# Patient Record
Sex: FEMALE | Race: WHITE | NOT HISPANIC OR LATINO | Employment: PART TIME | URBAN - METROPOLITAN AREA
[De-identification: names, ages, dates, MRNs, and addresses within clinical notes are randomized per-mention and may not be internally consistent; named-entity substitution may affect disease eponyms.]

---

## 2018-06-05 ENCOUNTER — HOSPITAL ENCOUNTER (EMERGENCY)
Facility: HOSPITAL | Age: 28
Discharge: HOME | End: 2018-06-05
Payer: MEDICAID

## 2018-06-05 VITALS
RESPIRATION RATE: 20 BRPM | WEIGHT: 203 LBS | HEIGHT: 69 IN | BODY MASS INDEX: 30.07 KG/M2 | DIASTOLIC BLOOD PRESSURE: 97 MMHG | TEMPERATURE: 98 F | HEART RATE: 71 BPM | SYSTOLIC BLOOD PRESSURE: 136 MMHG | OXYGEN SATURATION: 100 %

## 2018-06-05 DIAGNOSIS — F41.8 DEPRESSION WITH ANXIETY: Primary | ICD-10-CM

## 2018-06-05 PROCEDURE — 99283 EMERGENCY DEPT VISIT LOW MDM: CPT

## 2018-06-05 RX ORDER — ALPRAZOLAM 0.25 MG/1
0.25 TABLET ORAL 3 TIMES DAILY PRN
Qty: 15 TABLET | Refills: 0 | Status: SHIPPED | OUTPATIENT
Start: 2018-06-05 | End: 2022-04-21

## 2018-06-05 RX ORDER — SERTRALINE HYDROCHLORIDE 50 MG/1
50 TABLET, FILM COATED ORAL DAILY
Qty: 30 TABLET | Refills: 1 | Status: SHIPPED | OUTPATIENT
Start: 2018-06-05 | End: 2018-04-21

## 2018-06-05 NOTE — ED PROVIDER NOTES
Encounter Date: 6/5/2018       History   No chief complaint on file.    Patient to ER for evaluation, reports OBGYN told her that she needed to be seen in ER due thoughts of about new baby being sick all the time. Reports child is 5 months old and Mom was diagnosed with post partum depression right after delivery, reports trying different medications-Zoloft, Celexa didn't help much but doesn't think the Zoloft was given enough time to work. Also used Xanax prn. Reports now not taking anything. Is from [place] but visiting Mom her today. Patient denies any suicidal, homicidal thoughts or hallucinations.          Review of patient's allergies indicates:  Allergies not on file  No past medical history on file.  No past surgical history on file.  No family history on file.  Social History   Substance Use Topics    Smoking status: Not on file    Smokeless tobacco: Not on file    Alcohol use Not on file     Review of Systems   Constitutional: Negative.    HENT: Negative.    Eyes: Negative.    Respiratory: Negative.    Cardiovascular: Negative.    Musculoskeletal: Negative.    Psychiatric/Behavioral: Negative for agitation, behavioral problems, confusion, dysphoric mood, hallucinations, self-injury, sleep disturbance and suicidal ideas. The patient is nervous/anxious (Mild at times, not during exam.).        Physical Exam     Initial Vitals   BP Pulse Resp Temp SpO2   -- -- -- -- --      MAP       --         Physical Exam    Vitals reviewed.  Constitutional: She appears well-developed and well-nourished.   HENT:   Head: Normocephalic and atraumatic.   Neck: Normal range of motion. Neck supple. No thyromegaly present.   Cardiovascular: Normal rate, regular rhythm and normal heart sounds.   Pulmonary/Chest: Breath sounds normal.   Psychiatric: She has a normal mood and affect. Her behavior is normal. Judgment and thought content normal.   Very appropriate during exam. Answered questions appropriately. No anxious  "patterns or depressive symptoms manifested during visit. Patient does state she uses "google" a lot to look up things and thinks baby is always sick with something.          ED Course   Procedures  Labs Reviewed - No data to display         Education regarding setting up appointment with Psychiatrist or PCP upon arrival home. To start back on medications, stop using Google for medical advice, listen to Pediatrician. She verbalized understanding.                        Clinical Impression:   The encounter diagnosis was Depression with anxiety.    No orders to display                              JULIETTE Yun  06/05/18 [id number]    "

## 2018-06-05 NOTE — DISCHARGE INSTRUCTIONS
Follow up with Psychiatrist in [place] and OBGYN. Worsening in symptoms return to ER for further evaluation.

## 2020-07-03 ENCOUNTER — HISTORICAL (OUTPATIENT)
Dept: ADMINISTRATIVE | Facility: HOSPITAL | Age: 30
End: 2020-07-03

## 2020-07-03 LAB
APPEARANCE, UA: CLEAR
B-HCG UR QL: NEGATIVE
BACTERIA SPEC CULT: NEGATIVE /HPF
BASOPHILS NFR BLD: 0 10 (ref 0–0.1)
BASOPHILS NFR BLD: 0.4 % (ref 0–1.5)
BILIRUB UR QL STRIP: NEGATIVE MG/DL
BUDDING YEAST: NORMAL /HPF
CASTS, URINE MICROSCOPIC: NEGATIVE /LPF
COLOR UR: YELLOW
EOSINOPHIL NFR BLD: 0.1 10 (ref 0–0.7)
EOSINOPHIL NFR BLD: 1.8 % (ref 0–7)
EPITHELIAL, URINE MICROSCOPIC: NEGATIVE /HPF
ERYTHROCYTE [DISTWIDTH] IN BLOOD BY AUTOMATED COUNT: 13.2 % (ref 11.5–14.5)
GLUCOSE (UA): NEGATIVE MG/DL
GRAN #: 4.45 10 (ref 2–7.5)
GRAN%: 0.4 %
GRAN%: 60 % (ref 50–80)
HCT VFR BLD AUTO: 37 % (ref 37.7–47.9)
HGB BLD-MCNC: 12.1 G/DL (ref 12.2–16.2)
HGB UR QL STRIP: NEGATIVE ERY/UL
IMMATURE GRANULOCYTES #: 0.03 10
INTERNAL NEG CONTROL: NEGATIVE
INTERNAL POS CONTROL: POSITIVE
KETONES UR QL STRIP: NEGATIVE MG/DL
LEUKOCYTE ESTERASE UR QL STRIP: NEGATIVE LEU/UL
LYMPH #: 2.2 10 (ref 1–3.5)
LYMPH%: 30 % (ref 12–50)
MCH RBC QN AUTO: 31 PG (ref 27–31)
MCHC RBC AUTO-ENTMCNC: 32.7 G% (ref 32–35)
MCV RBC AUTO: 94.9 FL (ref 80–97)
MONO #: 0.6 10 (ref 0–0.8)
MONO%: 7.4 % (ref 0–12)
NITRITE UR QL STRIP: NEGATIVE MG/DL
PH UR STRIP: 5.5 [PH] (ref 5–7.5)
PMV BLD AUTO: 10.6 FL (ref 7.4–10.4)
PMV BLD AUTO: 250 10 (ref 142–424)
PROT UR QL STRIP: NEGATIVE MG/DL
RBC # BLD AUTO: 3.9 M/UL (ref 4.04–5.48)
RBC #/AREA URNS HPF: NEGATIVE /HPF
SP GR UR STRIP: 1.02 (ref 1–1.03)
SPERM, URINE MICROSCOPIC: NORMAL /HPF
TYPE OF SPECIMEN  (UA): NORMAL
UNCLASSIFIED CRYSTALS, UA: NORMAL /HPF
UROBILINOGEN UR STRIP-ACNC: 1 EU/L
WBC # BLD AUTO: 7.4 10 (ref 4–10.2)
WBC #/AREA URNS HPF: NEGATIVE /HPF

## 2020-07-04 LAB
ALBUMIN SERPL BCP-MCNC: 3.5 G/DL (ref 3.5–5)
ALBUMIN/GLOB SERPL ELPH: 1.2 {RATIO} (ref 1.5–2.2)
ALP SERPL-CCNC: 75 U/L (ref 45–117)
ALT SERPL W P-5'-P-CCNC: 16 U/L (ref 13–56)
ANION GAP SERPL CALC-SCNC: 4 MEQ/L (ref 10–20)
AST SERPL-CCNC: 9 U/L (ref 15–37)
BILIRUB SERPL-MCNC: 0.23 MG/DL (ref 0.2–1)
BUN SERPL-MCNC: 11 MG/DL (ref 7–18)
CALCIUM SERPL-MCNC: 8.2 MG/DL (ref 8.5–10.1)
CHLORIDE SERPL-SCNC: 109 MMOL/L (ref 98–107)
CO2 SERPL-SCNC: 30 MMOL/L (ref 22–32)
CREAT SERPL-MCNC: 0.65 MG/DL (ref 0.55–1.02)
EGFR: 115 ML/MIN/1.73M
GLOBULIN: 3 G/DL (ref 2.3–3.5)
GLUCOSE SERPL-MCNC: 82 MG/DL (ref 70–99)
OSMOC: 276 MOSM/KG (ref 275–295)
POTASSIUM SERPL-SCNC: 4 MMOL/L (ref 3.5–5.1)
PROT SERPL-MCNC: 6.5 G/DL (ref 6.4–8.2)
SODIUM BLD-SCNC: 139 MMOL/L (ref 136–145)

## 2022-04-21 ENCOUNTER — OFFICE VISIT (OUTPATIENT)
Dept: PRIMARY CARE CLINIC | Facility: CLINIC | Age: 32
End: 2022-04-21
Payer: MEDICAID

## 2022-04-21 VITALS
DIASTOLIC BLOOD PRESSURE: 53 MMHG | OXYGEN SATURATION: 99 % | WEIGHT: 197 LBS | HEIGHT: 70 IN | TEMPERATURE: 98 F | SYSTOLIC BLOOD PRESSURE: 106 MMHG | HEART RATE: 69 BPM | BODY MASS INDEX: 28.2 KG/M2

## 2022-04-21 DIAGNOSIS — L65.9 HAIR LOSS: ICD-10-CM

## 2022-04-21 DIAGNOSIS — L60.0 INGROWN RIGHT BIG TOENAIL: Primary | ICD-10-CM

## 2022-04-21 DIAGNOSIS — F17.210 MODERATE CIGARETTE SMOKER (10-19 PER DAY): ICD-10-CM

## 2022-04-21 PROCEDURE — 99999 PR PBB SHADOW E&M-EST. PATIENT-LVL III: CPT | Mod: PBBFAC,,, | Performed by: STUDENT IN AN ORGANIZED HEALTH CARE EDUCATION/TRAINING PROGRAM

## 2022-04-21 PROCEDURE — 3008F BODY MASS INDEX DOCD: CPT | Mod: CPTII,,, | Performed by: STUDENT IN AN ORGANIZED HEALTH CARE EDUCATION/TRAINING PROGRAM

## 2022-04-21 PROCEDURE — 1159F PR MEDICATION LIST DOCUMENTED IN MEDICAL RECORD: ICD-10-PCS | Mod: CPTII,,, | Performed by: STUDENT IN AN ORGANIZED HEALTH CARE EDUCATION/TRAINING PROGRAM

## 2022-04-21 PROCEDURE — 3074F SYST BP LT 130 MM HG: CPT | Mod: CPTII,,, | Performed by: STUDENT IN AN ORGANIZED HEALTH CARE EDUCATION/TRAINING PROGRAM

## 2022-04-21 PROCEDURE — 99999 PR PBB SHADOW E&M-EST. PATIENT-LVL III: ICD-10-PCS | Mod: PBBFAC,,, | Performed by: STUDENT IN AN ORGANIZED HEALTH CARE EDUCATION/TRAINING PROGRAM

## 2022-04-21 PROCEDURE — 3078F PR MOST RECENT DIASTOLIC BLOOD PRESSURE < 80 MM HG: ICD-10-PCS | Mod: CPTII,,, | Performed by: STUDENT IN AN ORGANIZED HEALTH CARE EDUCATION/TRAINING PROGRAM

## 2022-04-21 PROCEDURE — 99203 OFFICE O/P NEW LOW 30 MIN: CPT | Mod: S$PBB,,, | Performed by: STUDENT IN AN ORGANIZED HEALTH CARE EDUCATION/TRAINING PROGRAM

## 2022-04-21 PROCEDURE — 99213 OFFICE O/P EST LOW 20 MIN: CPT | Mod: PBBFAC,PN | Performed by: STUDENT IN AN ORGANIZED HEALTH CARE EDUCATION/TRAINING PROGRAM

## 2022-04-21 PROCEDURE — 3008F PR BODY MASS INDEX (BMI) DOCUMENTED: ICD-10-PCS | Mod: CPTII,,, | Performed by: STUDENT IN AN ORGANIZED HEALTH CARE EDUCATION/TRAINING PROGRAM

## 2022-04-21 PROCEDURE — 3078F DIAST BP <80 MM HG: CPT | Mod: CPTII,,, | Performed by: STUDENT IN AN ORGANIZED HEALTH CARE EDUCATION/TRAINING PROGRAM

## 2022-04-21 PROCEDURE — 1159F MED LIST DOCD IN RCRD: CPT | Mod: CPTII,,, | Performed by: STUDENT IN AN ORGANIZED HEALTH CARE EDUCATION/TRAINING PROGRAM

## 2022-04-21 PROCEDURE — 99203 PR OFFICE/OUTPT VISIT, NEW, LEVL III, 30-44 MIN: ICD-10-PCS | Mod: S$PBB,,, | Performed by: STUDENT IN AN ORGANIZED HEALTH CARE EDUCATION/TRAINING PROGRAM

## 2022-04-21 PROCEDURE — 3074F PR MOST RECENT SYSTOLIC BLOOD PRESSURE < 130 MM HG: ICD-10-PCS | Mod: CPTII,,, | Performed by: STUDENT IN AN ORGANIZED HEALTH CARE EDUCATION/TRAINING PROGRAM

## 2022-04-21 NOTE — ASSESSMENT & PLAN NOTE
Denies hair pulling. No visible patterns of hair loss. Will check for anemia and endocrine sources of hair loss.   - f/u labs  - f/u two weeks

## 2022-04-21 NOTE — PROGRESS NOTES
[facility] Note    Chief Complaint      Chief Complaint   Patient presents with    Establish Care     History of Present Illness      Bianca Richard is a 31 y.o. female who presents today for [facility]  Patient concerned about excessive hair loss. Since her last birth of daughter 4 years ago has noticed worsening hair loss. Hair tug test negative. No visible alopecia. Denies any endocrine disorders in family. No recent trauma or stressful event. Denies skin changes. Endorses chronic constipation from childhood.     Also, would like a permanent solution to ingrown toe nails. She often gets cyclical swelling, redness and pain which makes walking difficult, especially when her right first toe gets stubbed. Pain and swelling are at its minimum right now.   Patient would like referral to podiatry.      Past Medical History:  History reviewed. No pertinent past medical history.    Past Surgical History:   has a past surgical history that includes Cholecystectomy.    Family History:  family history is not on file.     Social History:  Social History     Tobacco Use    Smoking status: Current Every Day Smoker    Smokeless tobacco: Never Used   Substance Use Topics    Alcohol use: No    Drug use: No     I personally reviewed all past medical, surgical, social and family history.    Review of Systems   Constitutional: Negative for chills, fever, malaise/fatigue and weight loss.   HENT: Negative for congestion, ear discharge, ear pain, sinus pain and sore throat.    Eyes: Negative for blurred vision, pain, discharge and redness.   Respiratory: Negative for cough, hemoptysis, sputum production, shortness of breath, wheezing and stridor.    Cardiovascular: Negative for chest pain, palpitations and leg swelling.   Gastrointestinal: Negative for abdominal pain, blood in stool, constipation, diarrhea, nausea and vomiting.   Genitourinary: Negative for dysuria, frequency and hematuria.  "  Musculoskeletal: Negative for back pain, falls, joint pain, myalgias and neck pain.   Neurological: Negative for dizziness, tingling, focal weakness, seizures, weakness and headaches.   Endo/Heme/Allergies: Negative for environmental allergies and polydipsia. Does not bruise/bleed easily.   Psychiatric/Behavioral: Negative for depression and suicidal ideas. The patient is not nervous/anxious and does not have insomnia.       Medications:  Outpatient Encounter Medications as of 4/21/2022   Medication Sig Dispense Refill    [DISCONTINUED] ALPRAZolam (XANAX) 0.25 MG tablet Take 1 tablet (0.25 mg total) by mouth 3 (three) times daily as needed for Anxiety. 15 tablet 0    [DISCONTINUED] sertraline (ZOLOFT) 50 MG tablet Take 1 tablet (50 mg total) by mouth once daily. 30 tablet 1     No facility-administered encounter medications on file as of 4/21/2022.     Allergies:  Review of patient's allergies indicates:   Allergen Reactions    Amoxicillin Itching     Health Maintenance:    There is no immunization history on file for this patient.   Health Maintenance   Topic Date Due    Hepatitis C Screening  Never done    Lipid Panel  Never done    TETANUS VACCINE  Never done     The patient has no Health Maintenance topics of status Not Due  Health Maintenance Due   Topic Date Due    Hepatitis C Screening  Never done    Cervical Cancer Screening  Never done    Lipid Panel  Never done    COVID-19 Vaccine (1) Never done    Pneumococcal Vaccines (Age 0-64) (1 - PCV) Never done    HIV Screening  Never done    TETANUS VACCINE  Never done    Influenza Vaccine (1) Never done     Physical Exam      Vital Signs  Temp: 98.4 °F (36.9 °C)  Temp src: Oral  Pulse: 69  SpO2: 99 %  BP: (!) 106/53  BP Location: Right arm  Patient Position: Sitting  Height and Weight  Height: 5' 10" (177.8 cm)  Weight: 89.4 kg (196 lb 15.7 oz)  BSA (Calculated - sq m): 2.1 sq meters  BMI (Calculated): 28.3  Weight in (lb) to have BMI = 25: " 173.9]    Physical Exam  Vitals reviewed.   Constitutional:       General: She is not in acute distress.     Appearance: Normal appearance. She is normal weight. She is not ill-appearing or toxic-appearing.   HENT:      Head: Normocephalic and atraumatic.      Right Ear: External ear normal.      Left Ear: External ear normal.      Nose: Nose normal.      Mouth/Throat:      Mouth: Mucous membranes are moist.      Pharynx: Oropharynx is clear.   Eyes:      Extraocular Movements: Extraocular movements intact.      Conjunctiva/sclera: Conjunctivae normal.   Cardiovascular:      Rate and Rhythm: Normal rate and regular rhythm.      Pulses: Normal pulses.           Dorsalis pedis pulses are 2+ on the right side and 2+ on the left side.        Posterior tibial pulses are 2+ on the right side and 2+ on the left side.      Heart sounds: Normal heart sounds.   Pulmonary:      Effort: Pulmonary effort is normal. No respiratory distress.      Breath sounds: No stridor. No wheezing, rhonchi or rales.   Abdominal:      General: Abdomen is flat. Bowel sounds are normal. There is no distension.      Palpations: Abdomen is soft. There is no mass.      Tenderness: There is no abdominal tenderness. There is no right CVA tenderness, left CVA tenderness or guarding.   Musculoskeletal:         General: No tenderness. Normal range of motion.      Cervical back: Normal range of motion and neck supple. No rigidity or tenderness.      Right lower leg: No edema.      Left lower leg: No edema.      Right foot: Normal range of motion.      Left foot: Normal range of motion.        Feet:    Feet:      Right foot:      Skin integrity: Dry skin present. No ulcer, blister, skin breakdown, erythema or warmth.      Toenail Condition: Right toenails are ingrown.      Left foot:      Skin integrity: Dry skin present. No ulcer, blister, skin breakdown, erythema or warmth.      Toenail Condition: Left toenails are ingrown.   Skin:     General: Skin is  warm and dry.      Capillary Refill: Capillary refill takes less than 2 seconds.   Neurological:      General: No focal deficit present.      Mental Status: She is alert and oriented to person, place, and time. Mental status is at baseline.      Motor: No weakness.      Gait: Gait normal.   Psychiatric:         Mood and Affect: Mood normal.         Behavior: Behavior normal.         Thought Content: Thought content normal.         Judgment: Judgment normal.        Assessment/Plan     Bianca Richard is a 31 y.o.female with:    Problem List Items Addressed This Visit        Derm    Hair loss    Current Assessment & Plan     Denies hair pulling. No visible patterns of hair loss. Will check for anemia and endocrine sources of hair loss.   - f/u labs  - f/u two weeks            Relevant Orders    Lipid Panel    TSH    CBC Auto Differential    Comprehensive Metabolic Panel    Ingrown right big toenail - Primary    Relevant Orders    Ambulatory referral/consult to Podiatry       Endocrine    BMI 28.0-28.9,adult    Current Assessment & Plan     - stay physically active  - maintain daily adequate hydration 1.5 to 2L fluid volume  - as tolerated incorporate resistance training with stretching and cardio  - goal of 150 minutes per week of moderate intensity activity or 7,500 - 10,000 steps per day  - follow the Mediterranean diet  - include whole fresh fruits, vegetables, olive oil, seeds, nuts, whole grains, cold water fish, salmon, mackerel and lean cuts of meat    - do not drink sugary/diet carbonated beverages  - avoid fast or fried and processed food, especially canned foods   - avoid refined carbohydrates, white starchy foods, flour, white potato, bread, muffins, and cakes  - follow a healthy diet that include enough calcium, vitamin D and proteins for bone health                Other    Moderate cigarette smoker (10-19 per day)    Current Assessment & Plan     1/2 pack per day for over ten years. Patient currently is  not ready to quit. She understands  to smoking cessation will be discussed in future visits.                Other than changes above, cont current medications and maintain follow up with specialists.  Follow up in about 2 weeks (around 5/5/2022).    Future Appointments   Date Time Provider Department Center   5/5/2022  1:15 PM Reinaldo Camara DO [facility]       Kazumi G Yoshinaga, DO [facility]

## 2022-04-21 NOTE — ASSESSMENT & PLAN NOTE
- stay physically active  - maintain daily adequate hydration 1.5 to 2L fluid volume  - as tolerated incorporate resistance training with stretching and cardio  - goal of 150 minutes per week of moderate intensity activity or 7,500 - 10,000 steps per day  - follow the Mediterranean diet  - include whole fresh fruits, vegetables, olive oil, seeds, nuts, whole grains, cold water fish, salmon, mackerel and lean cuts of meat    - do not drink sugary/diet carbonated beverages  - avoid fast or fried and processed food, especially canned foods   - avoid refined carbohydrates, white starchy foods, flour, white potato, bread, muffins, and cakes  - follow a healthy diet that include enough calcium, vitamin D and proteins for bone health

## 2022-04-21 NOTE — ASSESSMENT & PLAN NOTE
1/2 pack per day for over ten years. Patient currently is not ready to quit. She understands  to smoking cessation will be discussed in future visits.

## 2022-10-04 ENCOUNTER — PATIENT MESSAGE (OUTPATIENT)
Dept: ADMINISTRATIVE | Facility: HOSPITAL | Age: 32
End: 2022-10-04
Payer: MEDICAID

## 2022-10-11 ENCOUNTER — OFFICE VISIT (OUTPATIENT)
Dept: PRIMARY CARE CLINIC | Facility: CLINIC | Age: 32
End: 2022-10-11
Payer: MEDICAID

## 2022-10-11 VITALS
RESPIRATION RATE: 14 BRPM | HEART RATE: 73 BPM | HEIGHT: 70 IN | SYSTOLIC BLOOD PRESSURE: 99 MMHG | DIASTOLIC BLOOD PRESSURE: 57 MMHG | OXYGEN SATURATION: 99 % | TEMPERATURE: 99 F | BODY MASS INDEX: 28.26 KG/M2

## 2022-10-11 DIAGNOSIS — L65.9 HAIR LOSS: ICD-10-CM

## 2022-10-11 DIAGNOSIS — D64.9 ANEMIA, UNSPECIFIED TYPE: ICD-10-CM

## 2022-10-11 DIAGNOSIS — F17.210 MODERATE CIGARETTE SMOKER (10-19 PER DAY): ICD-10-CM

## 2022-10-11 DIAGNOSIS — S99.101G: Primary | ICD-10-CM

## 2022-10-11 LAB
ALBUMIN SERPL BCP-MCNC: 3.6 G/DL (ref 3.5–5.2)
ALP SERPL-CCNC: 68 U/L (ref 55–135)
ALT SERPL W/O P-5'-P-CCNC: 22 U/L (ref 10–44)
ANION GAP SERPL CALC-SCNC: 5 MMOL/L (ref 8–16)
AST SERPL-CCNC: 16 U/L (ref 10–40)
BASOPHILS # BLD AUTO: 0.03 K/UL (ref 0–0.2)
BASOPHILS NFR BLD: 0.5 % (ref 0–1.9)
BILIRUB SERPL-MCNC: 0.4 MG/DL (ref 0.1–1)
BUN SERPL-MCNC: 8 MG/DL (ref 6–20)
CALCIUM SERPL-MCNC: 8.7 MG/DL (ref 8.7–10.5)
CHLORIDE SERPL-SCNC: 107 MMOL/L (ref 95–110)
CHOLEST SERPL-MCNC: 138 MG/DL (ref 120–199)
CHOLEST/HDLC SERPL: 2.8 {RATIO} (ref 2–5)
CO2 SERPL-SCNC: 29 MMOL/L (ref 23–29)
CREAT SERPL-MCNC: 0.6 MG/DL (ref 0.5–1.4)
DIFFERENTIAL METHOD: ABNORMAL
EOSINOPHIL # BLD AUTO: 0.1 K/UL (ref 0–0.5)
EOSINOPHIL NFR BLD: 1.6 % (ref 0–8)
ERYTHROCYTE [DISTWIDTH] IN BLOOD BY AUTOMATED COUNT: 12.9 % (ref 11.5–14.5)
EST. GFR  (NO RACE VARIABLE): >60 ML/MIN/1.73 M^2
FERRITIN SERPL-MCNC: 50 NG/ML (ref 20–300)
GLUCOSE SERPL-MCNC: 89 MG/DL (ref 70–110)
HCT VFR BLD AUTO: 36.9 % (ref 37–48.5)
HDLC SERPL-MCNC: 49 MG/DL (ref 40–75)
HDLC SERPL: 35.5 % (ref 20–50)
HGB BLD-MCNC: 12.5 G/DL (ref 12–16)
IMM GRANULOCYTES # BLD AUTO: 0.02 K/UL (ref 0–0.04)
IMM GRANULOCYTES NFR BLD AUTO: 0.4 % (ref 0–0.5)
IRON SATN MFR SERPL: 29 % (ref 20–50)
IRON SERPL-MCNC: 76 UG/DL (ref 30–160)
LDLC SERPL CALC-MCNC: 78.8 MG/DL (ref 63–159)
LYMPHOCYTES # BLD AUTO: 1.3 K/UL (ref 1–4.8)
LYMPHOCYTES NFR BLD: 23.1 % (ref 18–48)
MCH RBC QN AUTO: 30.9 PG (ref 27–31)
MCHC RBC AUTO-ENTMCNC: 33.9 G/DL (ref 32–36)
MCV RBC AUTO: 91 FL (ref 82–98)
MONOCYTES # BLD AUTO: 0.4 K/UL (ref 0.3–1)
MONOCYTES NFR BLD: 7.6 % (ref 4–15)
NEUTROPHILS # BLD AUTO: 3.8 K/UL (ref 1.8–7.7)
NEUTROPHILS NFR BLD: 66.8 % (ref 38–73)
NONHDLC SERPL-MCNC: 89 MG/DL
NRBC BLD-RTO: 0 /100 WBC
PLATELET # BLD AUTO: 241 K/UL (ref 150–450)
PMV BLD AUTO: 10.8 FL (ref 9.2–12.9)
POTASSIUM SERPL-SCNC: 3.8 MMOL/L (ref 3.5–5.1)
PROT SERPL-MCNC: 6.4 G/DL (ref 6–8.4)
RBC # BLD AUTO: 4.04 M/UL (ref 4–5.4)
SODIUM SERPL-SCNC: 141 MMOL/L (ref 136–145)
TOTAL IRON BINDING CAPACITY: 266 UG/DL (ref 250–450)
TRIGL SERPL-MCNC: 51 MG/DL (ref 30–150)
TSH SERPL DL<=0.005 MIU/L-ACNC: 1.48 UIU/ML (ref 0.4–4)
VIT B12 SERPL-MCNC: 286 PG/ML (ref 210–950)
WBC # BLD AUTO: 5.63 K/UL (ref 3.9–12.7)

## 2022-10-11 PROCEDURE — 99214 OFFICE O/P EST MOD 30 MIN: CPT | Mod: S$PBB,,, | Performed by: STUDENT IN AN ORGANIZED HEALTH CARE EDUCATION/TRAINING PROGRAM

## 2022-10-11 PROCEDURE — 99214 PR OFFICE/OUTPT VISIT, EST, LEVL IV, 30-39 MIN: ICD-10-PCS | Mod: S$PBB,,, | Performed by: STUDENT IN AN ORGANIZED HEALTH CARE EDUCATION/TRAINING PROGRAM

## 2022-10-11 PROCEDURE — 83540 ASSAY OF IRON: CPT | Performed by: STUDENT IN AN ORGANIZED HEALTH CARE EDUCATION/TRAINING PROGRAM

## 2022-10-11 PROCEDURE — 3078F PR MOST RECENT DIASTOLIC BLOOD PRESSURE < 80 MM HG: ICD-10-PCS | Mod: CPTII,,, | Performed by: STUDENT IN AN ORGANIZED HEALTH CARE EDUCATION/TRAINING PROGRAM

## 2022-10-11 PROCEDURE — 99999 PR PBB SHADOW E&M-EST. PATIENT-LVL III: CPT | Mod: PBBFAC,,, | Performed by: STUDENT IN AN ORGANIZED HEALTH CARE EDUCATION/TRAINING PROGRAM

## 2022-10-11 PROCEDURE — 80053 COMPREHEN METABOLIC PANEL: CPT | Performed by: STUDENT IN AN ORGANIZED HEALTH CARE EDUCATION/TRAINING PROGRAM

## 2022-10-11 PROCEDURE — 82607 VITAMIN B-12: CPT | Performed by: STUDENT IN AN ORGANIZED HEALTH CARE EDUCATION/TRAINING PROGRAM

## 2022-10-11 PROCEDURE — 80061 LIPID PANEL: CPT | Performed by: STUDENT IN AN ORGANIZED HEALTH CARE EDUCATION/TRAINING PROGRAM

## 2022-10-11 PROCEDURE — 85025 COMPLETE CBC W/AUTO DIFF WBC: CPT | Performed by: STUDENT IN AN ORGANIZED HEALTH CARE EDUCATION/TRAINING PROGRAM

## 2022-10-11 PROCEDURE — 3074F SYST BP LT 130 MM HG: CPT | Mod: CPTII,,, | Performed by: STUDENT IN AN ORGANIZED HEALTH CARE EDUCATION/TRAINING PROGRAM

## 2022-10-11 PROCEDURE — 3008F PR BODY MASS INDEX (BMI) DOCUMENTED: ICD-10-PCS | Mod: CPTII,,, | Performed by: STUDENT IN AN ORGANIZED HEALTH CARE EDUCATION/TRAINING PROGRAM

## 2022-10-11 PROCEDURE — 84443 ASSAY THYROID STIM HORMONE: CPT | Performed by: STUDENT IN AN ORGANIZED HEALTH CARE EDUCATION/TRAINING PROGRAM

## 2022-10-11 PROCEDURE — 3008F BODY MASS INDEX DOCD: CPT | Mod: CPTII,,, | Performed by: STUDENT IN AN ORGANIZED HEALTH CARE EDUCATION/TRAINING PROGRAM

## 2022-10-11 PROCEDURE — 1159F MED LIST DOCD IN RCRD: CPT | Mod: CPTII,,, | Performed by: STUDENT IN AN ORGANIZED HEALTH CARE EDUCATION/TRAINING PROGRAM

## 2022-10-11 PROCEDURE — 1159F PR MEDICATION LIST DOCUMENTED IN MEDICAL RECORD: ICD-10-PCS | Mod: CPTII,,, | Performed by: STUDENT IN AN ORGANIZED HEALTH CARE EDUCATION/TRAINING PROGRAM

## 2022-10-11 PROCEDURE — 99999 PR PBB SHADOW E&M-EST. PATIENT-LVL III: ICD-10-PCS | Mod: PBBFAC,,, | Performed by: STUDENT IN AN ORGANIZED HEALTH CARE EDUCATION/TRAINING PROGRAM

## 2022-10-11 PROCEDURE — 83921 ORGANIC ACID SINGLE QUANT: CPT | Performed by: STUDENT IN AN ORGANIZED HEALTH CARE EDUCATION/TRAINING PROGRAM

## 2022-10-11 PROCEDURE — 3078F DIAST BP <80 MM HG: CPT | Mod: CPTII,,, | Performed by: STUDENT IN AN ORGANIZED HEALTH CARE EDUCATION/TRAINING PROGRAM

## 2022-10-11 PROCEDURE — 99213 OFFICE O/P EST LOW 20 MIN: CPT | Mod: PBBFAC,PN | Performed by: STUDENT IN AN ORGANIZED HEALTH CARE EDUCATION/TRAINING PROGRAM

## 2022-10-11 PROCEDURE — 3074F PR MOST RECENT SYSTOLIC BLOOD PRESSURE < 130 MM HG: ICD-10-PCS | Mod: CPTII,,, | Performed by: STUDENT IN AN ORGANIZED HEALTH CARE EDUCATION/TRAINING PROGRAM

## 2022-10-11 PROCEDURE — 82728 ASSAY OF FERRITIN: CPT | Performed by: STUDENT IN AN ORGANIZED HEALTH CARE EDUCATION/TRAINING PROGRAM

## 2022-10-11 RX ORDER — HYDROCODONE BITARTRATE AND ACETAMINOPHEN 5; 325 MG/1; MG/1
TABLET ORAL
COMMUNITY
Start: 2022-10-10 | End: 2022-10-17 | Stop reason: DRUGHIGH

## 2022-10-11 NOTE — ASSESSMENT & PLAN NOTE
CBC in the past with borderline anemia. Concerning recent oral mucosal ulcer, painful may be the result of nutritional deficiency with hair loss complaint in the past visit. Patient never had follow up lab work up completed at the time.   Physical exam with small pale surface mucosa on inner cheeks bilaterally. No bleeding, no vesicles visualized.   - f/u anemia panel, cbc

## 2022-10-11 NOTE — ASSESSMENT & PLAN NOTE
One day history of right foot fracture from rolling over during a fall from standing position. Neurovascular exam normal. Patient's pain well controlled on ibuprofen. Continue with off loading with crutches.   - f/u referral to orthopedic specialist

## 2022-10-11 NOTE — ASSESSMENT & PLAN NOTE
Wt Readings from Last 3 Encounters:   04/21/22 1432 89.4 kg (196 lb 15.7 oz)   06/05/18 1219 92.1 kg (203 lb)

## 2022-10-11 NOTE — ASSESSMENT & PLAN NOTE
Still smoking 1/2 pack per day for over eleven years.  Has not increased daily smoking. Patient currently is not ready to quit. She understands  to smoking cessation will be discussed in future visits.

## 2022-10-11 NOTE — PROGRESS NOTES
[facility] Note    Chief Complaint      Chief Complaint   Patient presents with    Oral Pain     Mouth ulcers about 2 weeks ago. She states she has never gotten this before. She states they are painful, on both sides of her inner cheeks. She has had no changes in diet, no recent completion of antibiotics or steroid use. HIV, STI screen with OBGYN every 6 mo are negative.     Referral     Broke her right foot last night from falling from missed steppage. She went to urgent care and has imagining with her on disk. Complains of tingling over the right lateral foot, sensation intact throughout, pain under control with ibuprofen at this time. Foot wrapped in ACE bandage, non-weight bearing with crutches at this time.      History of Present Illness      Bianca Richard is a 31 y.o. female who presents today for Oral Pain (Mouth ulcers about 2 weeks ago. She states she has never gotten this before. She states they are painful, on both sides of her inner cheeks. She has had no changes in diet, no recent completion of antibiotics or steroid use. HIV, STI screen with OBGYN every 6 mo are negative. ) and Referral (Broke her right foot last night from falling from missed steppage. She went to urgent care and has imagining with her on disk. Complains of tingling over the right lateral foot, sensation intact throughout, pain under control with ibuprofen at this time. Foot wrapped in ACE bandage, non-weight bearing with crutches at this time. )     Currently undergoing dental care with lower molars removed.     Denies fever, chills, headache, vision changes, dizziness, chest pain, palpitations, abdominal pain, nausea, vomiting.   Complains of constipation every 2-3 days since her cholecystectomy.      Past Medical History:  Past Medical History:   Diagnosis Date    Depression     Hx of abnormal cervical Pap smear      Past Surgical History:   has a past surgical history that includes Cholecystectomy.    Family  History:  family history includes Hypertension in her father.     Social History:  Social History     Tobacco Use    Smoking status: Every Day     Packs/day: 0.50     Types: Cigarettes    Smokeless tobacco: Never   Substance Use Topics    Alcohol use: No    Drug use: No     I personally reviewed all past medical, surgical, social and family history.    Review of Systems   Constitutional:  Negative for chills, fever, malaise/fatigue and weight loss.   HENT:  Negative for congestion, ear discharge, ear pain, sinus pain and sore throat.    Eyes:  Negative for blurred vision, pain, discharge and redness.   Respiratory:  Negative for cough, hemoptysis, sputum production, shortness of breath, wheezing and stridor.    Cardiovascular:  Negative for chest pain, palpitations and leg swelling.   Gastrointestinal:  Negative for abdominal pain, blood in stool, constipation, diarrhea, nausea and vomiting.   Genitourinary:  Negative for dysuria, frequency and hematuria.   Musculoskeletal:  Negative for back pain, falls, joint pain, myalgias and neck pain.   Skin: Negative.  Negative for rash.   Neurological:  Negative for dizziness, tingling, focal weakness, seizures, weakness and headaches.   Endo/Heme/Allergies:  Negative for environmental allergies and polydipsia. Does not bruise/bleed easily.   Psychiatric/Behavioral:  Negative for depression and suicidal ideas. The patient is not nervous/anxious and does not have insomnia.       Medications:  Outpatient Encounter Medications as of 10/11/2022   Medication Sig Dispense Refill    HYDROcodone-acetaminophen (NORCO) 5-325 mg per tablet Take by mouth.       No facility-administered encounter medications on file as of 10/11/2022.     Allergies:  Review of patient's allergies indicates:   Allergen Reactions    Amoxicillin Itching     Health Maintenance:  Immunization History   Administered Date(s) Administered    DTaP 01/28/1991, 04/22/1991, 09/09/1991, 05/03/1993, 08/04/1995    HIB  "04/22/1991, 09/09/1991, 05/03/1993    Hepatitis B, Pediatric/Adolescent 09/22/2004, 11/03/2004, 04/01/2005    IPV 01/28/1991, 04/22/1991, 05/03/1993, 08/04/1995    MMR 05/03/1993, 08/04/1995    Td - PF (ADULT) 09/22/2004    Tdap 01/11/2018    Varicella 09/22/2004      Health Maintenance   Topic Date Due    Hepatitis C Screening  Never done    Lipid Panel  Never done    TETANUS VACCINE  01/11/2028      Physical Exam      Vital Signs  Temp: 98.5 °F (36.9 °C)  Temp src: Oral  Pulse: 73  Resp: 14  SpO2: 99 %  BP: (!) 99/57  BP Location: Right arm  Patient Position: Sitting  Pain Score:   4  Pain Loc: Mouth  Height and Weight  Height: 5' 10" (177.8 cm)  Weight in (lb) to have BMI = 25: 173.9]    Physical Exam  Vitals reviewed.   Constitutional:       General: She is not in acute distress.     Appearance: Normal appearance. She is normal weight. She is not ill-appearing or toxic-appearing.   HENT:      Head: Normocephalic and atraumatic.      Right Ear: External ear normal.      Left Ear: External ear normal.      Nose: Nose normal.      Mouth/Throat:      Mouth: Mucous membranes are moist.      Pharynx: Oropharynx is clear.   Eyes:      Extraocular Movements: Extraocular movements intact.      Conjunctiva/sclera: Conjunctivae normal.   Cardiovascular:      Rate and Rhythm: Normal rate and regular rhythm.      Pulses: Normal pulses.           Dorsalis pedis pulses are 2+ on the left side.        Posterior tibial pulses are 2+ on the left side.      Heart sounds: Normal heart sounds.   Pulmonary:      Effort: Pulmonary effort is normal. No respiratory distress.      Breath sounds: No stridor. No wheezing, rhonchi or rales.   Abdominal:      General: Abdomen is flat. There is no distension.      Palpations: Abdomen is soft. There is no mass.      Tenderness: There is no abdominal tenderness. There is no right CVA tenderness, left CVA tenderness or guarding.   Musculoskeletal:         General: No tenderness. Normal range of " motion.      Cervical back: Normal range of motion and neck supple. No rigidity or tenderness.      Right lower leg: No edema.      Left lower leg: No edema.      Left foot: Normal range of motion.        Feet:    Feet:      Left foot:      Skin integrity: No ulcer, blister, skin breakdown, erythema, warmth or dry skin.      Comments: Right foot wrapped and dressed, sensation in toes intact. Capillary refill <2 seconds.  No proximal swelling or tenderness in right leg.  Skin:     General: Skin is warm and dry.      Capillary Refill: Capillary refill takes less than 2 seconds.   Neurological:      General: No focal deficit present.      Mental Status: She is alert and oriented to person, place, and time. Mental status is at baseline.      Motor: No weakness.      Gait: Gait normal.   Psychiatric:         Mood and Affect: Mood normal.         Behavior: Behavior normal.         Thought Content: Thought content normal.         Judgment: Judgment normal.      Laboratory:  CBC:  Recent Labs   Lab 07/03/20  2336   WBC 7.4   RBC 3.90 L   Hemoglobin 12.1 L   Hematocrit 37.0 L   MCV 94.9   MCH 31.0   MCHC 32.7     CMP:  Recent Labs   Lab 07/03/20  2336   Glucose 82   Calcium 8.2 L   Albumin 3.5   Total Protein 6.5   Sodium 139   Potassium 4.0   CO2 30   Chloride 109 H   BUN 11   Alkaline Phosphatase 75   ALT 16   AST 9 L   Total Bilirubin 0.23     URINALYSIS:  Recent Labs   Lab 07/03/20  2305   Color, UA YELLOW   Specific Gravity, UA 1.020   pH, UA 5.5   Protein, UA NEGATIVE   Bacteria NEGATIVE   Nitrite, UA NEGATIVE   Leukocytes, UA NEGATIVE   Urobilinogen, UA 1.0      Assessment/Plan     Bianca Richard is a 31 y.o.female with:    Problem List Items Addressed This Visit          Derm    Hair loss       Oncology    Anemia    Current Assessment & Plan     CBC in the past with borderline anemia. Concerning recent oral mucosal ulcer, painful may be the result of nutritional deficiency with hair loss complaint in the past visit.  Patient never had follow up lab work up completed at the time.   Physical exam with small pale surface mucosa on inner cheeks bilaterally. No bleeding, no vesicles visualized.   - f/u anemia panel, cbc            Relevant Orders    Ferritin    Iron and TIBC    Vitamin B12       Endocrine    BMI 28.0-28.9,adult    Current Assessment & Plan     Wt Readings from Last 3 Encounters:   04/21/22 1432 89.4 kg (196 lb 15.7 oz)   06/05/18 1219 92.1 kg (203 lb)             Orthopedic    Closed physeal fracture of metatarsal bone of right foot with delayed healing - Primary    Current Assessment & Plan     One day history of right foot fracture from rolling over during a fall from standing position. Neurovascular exam normal. Patient's pain well controlled on ibuprofen. Continue with off loading with crutches.   - f/u referral to orthopedic specialist         Relevant Orders    Ambulatory referral/consult to Orthopedics    Vitamin D       Other    Moderate cigarette smoker (10-19 per day)    Current Assessment & Plan     Still smoking 1/2 pack per day for over eleven years.  Has not increased daily smoking. Patient currently is not ready to quit. She understands  to smoking cessation will be discussed in future visits.           Other than changes above, cont current medications and maintain follow up with specialists.  No follow-ups on file.    Future Appointments   Date Time Provider Department Center   10/18/2022  1:45 PM Reinaldo Camara DO [facility]     Kazumi G Yoshinaga, DO [facility]

## 2022-10-14 ENCOUNTER — HOSPITAL ENCOUNTER (EMERGENCY)
Facility: HOSPITAL | Age: 32
Discharge: HOME | End: 2022-10-14
Attending: STUDENT IN AN ORGANIZED HEALTH CARE EDUCATION/TRAINING PROGRAM
Payer: MEDICAID

## 2022-10-14 VITALS
WEIGHT: 193 LBS | HEIGHT: 69 IN | OXYGEN SATURATION: 100 % | RESPIRATION RATE: 18 BRPM | TEMPERATURE: 98 F | DIASTOLIC BLOOD PRESSURE: 75 MMHG | BODY MASS INDEX: 28.58 KG/M2 | HEART RATE: 77 BPM | SYSTOLIC BLOOD PRESSURE: 139 MMHG

## 2022-10-14 DIAGNOSIS — Z47.89 AFTERCARE FOR CAST OR SPLINT CHECK OR CHANGE: Primary | ICD-10-CM

## 2022-10-14 PROCEDURE — 99282 EMERGENCY DEPT VISIT SF MDM: CPT

## 2022-10-15 LAB — METHYLMALONATE SERPL-SCNC: 0.25 UMOL/L

## 2022-10-15 NOTE — ED PROVIDER NOTES
"Encounter Date: 10/14/2022       History     Chief Complaint   Patient presents with    Foot Pain     Seen at Urgent Care on Monday and was told right foot was broken,  today started with feeling "toes were cold and foot felt asleep."  Has ortho appt for monday     31-year-old female presents emergency room for splint check.  Patient was diagnosed with a broken foot on Monday by urgent care.  Ortho appointment on Monday of next week.  Patient concerned that her toes are cold and having tingling    Review of patient's allergies indicates:   Allergen Reactions    Amoxicillin Itching     Past Medical History:   Diagnosis Date    Depression     Hx of abnormal cervical Pap smear      Past Surgical History:   Procedure Laterality Date    CHOLECYSTECTOMY       Family History   Problem Relation Age of Onset    Hypertension Father      Social History     Tobacco Use    Smoking status: Every Day     Packs/day: 0.50     Types: Cigarettes    Smokeless tobacco: Never   Substance Use Topics    Alcohol use: No    Drug use: No     Review of Systems   Constitutional:  Negative for fever.   HENT:  Negative for sore throat.    Respiratory:  Negative for shortness of breath.    Cardiovascular:  Negative for chest pain.   Gastrointestinal:  Negative for nausea.   Genitourinary:  Negative for dysuria.   Musculoskeletal:  Positive for joint swelling. Negative for back pain.   Skin:  Negative for rash.   Neurological:  Negative for weakness.   Hematological:  Does not bruise/bleed easily.   All other systems reviewed and are negative.    Physical Exam     Initial Vitals [10/14/22 1834]   BP Pulse Resp Temp SpO2   139/75 77 18 98.3 °F (36.8 °C) 100 %      MAP       --         Physical Exam    Nursing note and vitals reviewed.  Constitutional: She appears well-developed.   HENT:   Head: Normocephalic.   Eyes: Pupils are equal, round, and reactive to light.   Neck:   Normal range of motion.  Musculoskeletal:         General: Tenderness and " edema present.      Cervical back: Normal range of motion.      Comments: Good pedal pulse.  Good capillary refill     Skin:   Bruising noted to told on right foot and swelling noted to right foot and ankle       ED Course   Procedures  Labs Reviewed - No data to display       Imaging Results    None          Medications - No data to display  Medical Decision Making:   Differential Diagnosis:   Splint check                        Clinical Impression:   Final diagnoses:  [Z47.89] Aftercare for cast or splint check or change (Primary)        ED Disposition Condition    Discharge Stable          ED Prescriptions    None       Follow-up Information    None          Natalie English NP  10/14/22 2029

## 2022-10-16 DIAGNOSIS — S99.101G: Primary | ICD-10-CM

## 2022-10-17 ENCOUNTER — OFFICE VISIT (OUTPATIENT)
Dept: ORTHOPEDICS | Facility: CLINIC | Age: 32
End: 2022-10-17
Payer: MEDICAID

## 2022-10-17 ENCOUNTER — HOSPITAL ENCOUNTER (OUTPATIENT)
Dept: RADIOLOGY | Facility: HOSPITAL | Age: 32
Discharge: HOME | End: 2022-10-17
Attending: NURSE PRACTITIONER
Payer: MEDICAID

## 2022-10-17 VITALS
HEART RATE: 81 BPM | HEIGHT: 69 IN | OXYGEN SATURATION: 100 % | SYSTOLIC BLOOD PRESSURE: 127 MMHG | DIASTOLIC BLOOD PRESSURE: 76 MMHG | WEIGHT: 190 LBS | BODY MASS INDEX: 28.14 KG/M2

## 2022-10-17 DIAGNOSIS — S99.101G: ICD-10-CM

## 2022-10-17 DIAGNOSIS — S92.351A CLOSED FRACTURE OF BASE OF FIFTH METATARSAL BONE OF RIGHT FOOT, INITIAL ENCOUNTER: Primary | ICD-10-CM

## 2022-10-17 PROCEDURE — 99999 PR PBB SHADOW E&M-EST. PATIENT-LVL III: CPT | Mod: PBBFAC,,, | Performed by: NURSE PRACTITIONER

## 2022-10-17 PROCEDURE — 99213 OFFICE O/P EST LOW 20 MIN: CPT | Mod: PBBFAC,25 | Performed by: NURSE PRACTITIONER

## 2022-10-17 PROCEDURE — 3078F DIAST BP <80 MM HG: CPT | Mod: CPTII,,, | Performed by: NURSE PRACTITIONER

## 2022-10-17 PROCEDURE — 1160F PR REVIEW ALL MEDS BY PRESCRIBER/CLIN PHARMACIST DOCUMENTED: ICD-10-PCS | Mod: CPTII,,, | Performed by: NURSE PRACTITIONER

## 2022-10-17 PROCEDURE — 1159F PR MEDICATION LIST DOCUMENTED IN MEDICAL RECORD: ICD-10-PCS | Mod: CPTII,,, | Performed by: NURSE PRACTITIONER

## 2022-10-17 PROCEDURE — 3074F SYST BP LT 130 MM HG: CPT | Mod: CPTII,,, | Performed by: NURSE PRACTITIONER

## 2022-10-17 PROCEDURE — 1160F RVW MEDS BY RX/DR IN RCRD: CPT | Mod: CPTII,,, | Performed by: NURSE PRACTITIONER

## 2022-10-17 PROCEDURE — 28470 CLTX METATARSAL FX WO MNP EA: CPT | Mod: S$PBB,RT,, | Performed by: NURSE PRACTITIONER

## 2022-10-17 PROCEDURE — 28470 CLTX METATARSAL FX WO MNP EA: CPT | Mod: PBBFAC | Performed by: NURSE PRACTITIONER

## 2022-10-17 PROCEDURE — 3074F PR MOST RECENT SYSTOLIC BLOOD PRESSURE < 130 MM HG: ICD-10-PCS | Mod: CPTII,,, | Performed by: NURSE PRACTITIONER

## 2022-10-17 PROCEDURE — 99999 PR PBB SHADOW E&M-EST. PATIENT-LVL III: ICD-10-PCS | Mod: PBBFAC,,, | Performed by: NURSE PRACTITIONER

## 2022-10-17 PROCEDURE — 99499 UNLISTED E&M SERVICE: CPT | Mod: S$PBB,,, | Performed by: NURSE PRACTITIONER

## 2022-10-17 PROCEDURE — 99499 NO LOS: ICD-10-PCS | Mod: S$PBB,,, | Performed by: NURSE PRACTITIONER

## 2022-10-17 PROCEDURE — 3078F PR MOST RECENT DIASTOLIC BLOOD PRESSURE < 80 MM HG: ICD-10-PCS | Mod: CPTII,,, | Performed by: NURSE PRACTITIONER

## 2022-10-17 PROCEDURE — 1159F MED LIST DOCD IN RCRD: CPT | Mod: CPTII,,, | Performed by: NURSE PRACTITIONER

## 2022-10-17 PROCEDURE — 73630 X-RAY EXAM OF FOOT: CPT | Mod: TC,RT

## 2022-10-17 PROCEDURE — 28470 PR CLOSED RX METATARSAL FX: ICD-10-PCS | Mod: S$PBB,RT,, | Performed by: NURSE PRACTITIONER

## 2022-10-17 RX ORDER — HYDROCODONE BITARTRATE AND ACETAMINOPHEN 10; 325 MG/1; MG/1
1 TABLET ORAL EVERY 6 HOURS PRN
Qty: 20 TABLET | Refills: 0 | Status: SHIPPED | OUTPATIENT
Start: 2022-10-17 | End: 2022-10-25 | Stop reason: CLARIF

## 2022-10-17 NOTE — PROGRESS NOTES
Subjective:      Bianca Richard is a 31 y.o. female who presents with right foot fracture. Onset of the injury was  10/10/22 . Precipitating event: She states that she rolled her foot stepping off of a side walk. She had immediate pain in her lateral mid foot. She went to urgent care and radiographs showed a nondisplaced fracture of the base of the fifth metatarsal. She was splinted and placed on crutches. She was given norco for pain control. She states that she went to the ER on 10/14/22 because her foot felt cold and the splint was adjusted. She presents on crutches and rates her pain as 7/10. Current symptoms include: ability to bear weight, but with pain. Aggravating factors: any weight bearing. Symptoms have been well-controlled. Patient has had no prior foot problems. Evaluation to date: plain films: abnormal . Treatment to date:  splint, crutches and pain meds .      Review of Systems  Review of Systems   Constitutional: Negative.  Negative for fever.   Musculoskeletal:  Positive for joint pain (RT foot).   Skin: Negative.    Neurological: Negative.    Psychiatric/Behavioral: Negative.     All other systems reviewed and are negative.    Objective:   Neurologically intact.    Gait Crutch assisted.  Right foot:  Soft tissue swelling and bruising noted over the lateral mid-foot.  Tenderness of the base of the fifth metatarsal  Non-tender ankle joint. ROM foot decreased.   Sensation intact distally.   Left foot:  Normal exam, no swelling, tenderness, instability; ligaments intact, full Range of motion of all ankle/foot joints     Cap refill brisk    Imaging:  X-ray of the right foot: Imaging reviewed   Nondisplaced fracture involving the base of the fifth metatarsal.  Assessment:      Nondisplaced fracture involving the base of the right fifth metatarsal.    Plan:     Natural history and expected course discussed. Rest, ice, compression, and elevation (RICE) therapy.  Placed in a walker boot for fracture  immobilization and rest, PWB as directed with crutches. She will need 6-10 weeks immobilization.   Pain meds as directed. (Norco 10/325mg prn Q 6 HR, instructed on dosing and side effects.   RTC one week for repeat radiographs.   Questions answered.

## 2022-10-17 NOTE — LETTER
October 17, 2022      [facility]  [address] [place] [address], [address]  [facility] [address]  Phone: [phone]  Fax: [phone]       Patient: Bianca Richard   YOB: 1990  Date of Visit: 10/17/2022    To Whom It May Concern:    Rory Richard  was at [facility] on 10/17/2022. The patient may return to work today. She will be in a walking boot. She will need to be able to sit and stand only infrequently. If you have any questions or concerns, or if I can be of further assistance, please do not hesitate to contact me.    Sincerely,    Chidi Spangler, NP

## 2022-10-18 ENCOUNTER — OFFICE VISIT (OUTPATIENT)
Dept: PRIMARY CARE CLINIC | Facility: CLINIC | Age: 32
End: 2022-10-18
Payer: MEDICAID

## 2022-10-18 VITALS
TEMPERATURE: 98 F | HEART RATE: 73 BPM | DIASTOLIC BLOOD PRESSURE: 59 MMHG | HEIGHT: 69 IN | SYSTOLIC BLOOD PRESSURE: 101 MMHG | WEIGHT: 190 LBS | OXYGEN SATURATION: 98 % | BODY MASS INDEX: 28.14 KG/M2 | RESPIRATION RATE: 14 BRPM

## 2022-10-18 DIAGNOSIS — D64.9 ANEMIA, UNSPECIFIED TYPE: Primary | ICD-10-CM

## 2022-10-18 DIAGNOSIS — S99.101G: ICD-10-CM

## 2022-10-18 DIAGNOSIS — Z11.3 ROUTINE SCREENING FOR STI (SEXUALLY TRANSMITTED INFECTION): ICD-10-CM

## 2022-10-18 DIAGNOSIS — M25.469 JOINT SWELLING OF LOWER LEG: ICD-10-CM

## 2022-10-18 DIAGNOSIS — L60.0 INGROWN RIGHT BIG TOENAIL: ICD-10-CM

## 2022-10-18 DIAGNOSIS — S92.356B NONDISP FX OF 5TH METATARSAL BONE, UNSP FT, INIT FOR OPN FX: ICD-10-CM

## 2022-10-18 DIAGNOSIS — E78.2 MIXED HYPERLIPIDEMIA: ICD-10-CM

## 2022-10-18 LAB
CRP SERPL-MCNC: <0.29 MG/DL (ref 0–0.75)
ERYTHROCYTE [SEDIMENTATION RATE] IN BLOOD: 10 MM/HR (ref 0–20)

## 2022-10-18 PROCEDURE — 3074F PR MOST RECENT SYSTOLIC BLOOD PRESSURE < 130 MM HG: ICD-10-PCS | Mod: CPTII,,, | Performed by: STUDENT IN AN ORGANIZED HEALTH CARE EDUCATION/TRAINING PROGRAM

## 2022-10-18 PROCEDURE — 99999 PR PBB SHADOW E&M-EST. PATIENT-LVL III: ICD-10-PCS | Mod: PBBFAC,,, | Performed by: STUDENT IN AN ORGANIZED HEALTH CARE EDUCATION/TRAINING PROGRAM

## 2022-10-18 PROCEDURE — 85651 RBC SED RATE NONAUTOMATED: CPT | Performed by: STUDENT IN AN ORGANIZED HEALTH CARE EDUCATION/TRAINING PROGRAM

## 2022-10-18 PROCEDURE — 3074F SYST BP LT 130 MM HG: CPT | Mod: CPTII,,, | Performed by: STUDENT IN AN ORGANIZED HEALTH CARE EDUCATION/TRAINING PROGRAM

## 2022-10-18 PROCEDURE — 1159F PR MEDICATION LIST DOCUMENTED IN MEDICAL RECORD: ICD-10-PCS | Mod: CPTII,,, | Performed by: STUDENT IN AN ORGANIZED HEALTH CARE EDUCATION/TRAINING PROGRAM

## 2022-10-18 PROCEDURE — 87529 HSV DNA AMP PROBE: CPT | Performed by: STUDENT IN AN ORGANIZED HEALTH CARE EDUCATION/TRAINING PROGRAM

## 2022-10-18 PROCEDURE — 3078F PR MOST RECENT DIASTOLIC BLOOD PRESSURE < 80 MM HG: ICD-10-PCS | Mod: CPTII,,, | Performed by: STUDENT IN AN ORGANIZED HEALTH CARE EDUCATION/TRAINING PROGRAM

## 2022-10-18 PROCEDURE — 99214 PR OFFICE/OUTPT VISIT, EST, LEVL IV, 30-39 MIN: ICD-10-PCS | Mod: S$PBB,,, | Performed by: STUDENT IN AN ORGANIZED HEALTH CARE EDUCATION/TRAINING PROGRAM

## 2022-10-18 PROCEDURE — 1159F MED LIST DOCD IN RCRD: CPT | Mod: CPTII,,, | Performed by: STUDENT IN AN ORGANIZED HEALTH CARE EDUCATION/TRAINING PROGRAM

## 2022-10-18 PROCEDURE — 99214 OFFICE O/P EST MOD 30 MIN: CPT | Mod: S$PBB,,, | Performed by: STUDENT IN AN ORGANIZED HEALTH CARE EDUCATION/TRAINING PROGRAM

## 2022-10-18 PROCEDURE — 99213 OFFICE O/P EST LOW 20 MIN: CPT | Mod: PBBFAC,PN | Performed by: STUDENT IN AN ORGANIZED HEALTH CARE EDUCATION/TRAINING PROGRAM

## 2022-10-18 PROCEDURE — 86803 HEPATITIS C AB TEST: CPT | Performed by: STUDENT IN AN ORGANIZED HEALTH CARE EDUCATION/TRAINING PROGRAM

## 2022-10-18 PROCEDURE — 99999 PR PBB SHADOW E&M-EST. PATIENT-LVL III: CPT | Mod: PBBFAC,,, | Performed by: STUDENT IN AN ORGANIZED HEALTH CARE EDUCATION/TRAINING PROGRAM

## 2022-10-18 PROCEDURE — 86140 C-REACTIVE PROTEIN: CPT | Performed by: STUDENT IN AN ORGANIZED HEALTH CARE EDUCATION/TRAINING PROGRAM

## 2022-10-18 PROCEDURE — 86592 SYPHILIS TEST NON-TREP QUAL: CPT | Performed by: STUDENT IN AN ORGANIZED HEALTH CARE EDUCATION/TRAINING PROGRAM

## 2022-10-18 PROCEDURE — 3078F DIAST BP <80 MM HG: CPT | Mod: CPTII,,, | Performed by: STUDENT IN AN ORGANIZED HEALTH CARE EDUCATION/TRAINING PROGRAM

## 2022-10-18 PROCEDURE — 87389 HIV-1 AG W/HIV-1&-2 AB AG IA: CPT | Performed by: STUDENT IN AN ORGANIZED HEALTH CARE EDUCATION/TRAINING PROGRAM

## 2022-10-18 PROCEDURE — 82306 VITAMIN D 25 HYDROXY: CPT | Performed by: STUDENT IN AN ORGANIZED HEALTH CARE EDUCATION/TRAINING PROGRAM

## 2022-10-18 NOTE — ASSESSMENT & PLAN NOTE
Follow up ortho, now in walker boot to keep right foot immobile and non weight bearing.   Per patient, requires to be immobile for 10-12 weeks. Pain well controlled. Work has been adjusted where she can be seated for the majority of time.   - f/u ortho recommendations

## 2022-10-18 NOTE — ASSESSMENT & PLAN NOTE
Wt Readings from Last 3 Encounters:   10/18/22 1403 86.2 kg (190 lb)   10/17/22 0847 86.2 kg (190 lb)   10/14/22 1832 87.5 kg (193 lb)   Recommendations:   Stay physically active. As tolerated alternate resistance training with stretching and cardio. Goal of 150 minutes per week of moderate intensity activity or 7,500 - 10,000 steps per day. Follow the Mediterranean Diet. Include whole fresh fruits, vegetables, olive oil, seeds, nuts, whole grains, cold water fish, salmon, mackerel and lean cuts of meat.  Do not drink sugary/diet carbonated beverages. Decrease portion sizes slightly which will result in an approximately 500-calorie deficit. Avoid fast or fried and processed food, especially canned foods. Avoid refined carbohydrates, white starchy foods, flour, white potato, bread, muffins, and cakes. Consider substituting one meal a day with a meal replacement such as Slim fast, lean cuisine, or weight watcher's. Follow a healthy diet that includes enough calcium, vitamin D and proteins for bone health.

## 2022-10-18 NOTE — ASSESSMENT & PLAN NOTE
Current treatment:  Current Outpatient Medications on File Prior to Visit   Medication Sig Dispense Refill    HYDROcodone-acetaminophen (NORCO)  mg per tablet Take 1 tablet by mouth every 6 (six) hours as needed for Pain. 20 tablet 0     No current facility-administered medications on file prior to visit.       Side effects from current treatment: none  Exercise: not active  Diet: cardiac diet  Family history of CAD: No  Family history of CVA: No    Lab Results   Component Value Date    CHOL 138 10/11/2022     Lab Results   Component Value Date    HDL 49 10/11/2022     Lab Results   Component Value Date    LDLCALC 78.8 10/11/2022     Lab Results   Component Value Date    TRIG 51 10/11/2022     Lab Results   Component Value Date    CHOLHDL 35.5 10/11/2022   - To help improve your cardiovascular health and reduce your risk of stroke or heart attack, the following healthy lifestyle changes are recommended.   - Choose whole food items, fresh/frozen fruits and vegetables and unprocessed meats.  - Avoid processed food items, cold cuts, canned foods in sugary and high sodium preservatives.   - Reduce intake of highly refined carbohydrates or white starchy foods, white bread, white flour pasta, sugary cereals, sugary drinks, sweet tea, soda including diet, candies, cakes and donuts.    - Reduce or avoid saturated fats (fats from animals and processed oils like palm oil, peanut oil, vegetable oil) and fried food items.   - Increase healthy fats like omega-3 fish oil, fatty fish (salmon, mackerel, sardine etc), olive oil, avocado, raw nuts etc.   - Increase fiber intake with daily goal of 6-8 servings of vegetables.  - Remember to maintain daily adequate hydration with water 1.5 to 2 liters fluid volume.    - Daily physical activities, start slow with 10-15 minutes of walk daily, then increase as tolerated to twice daily.   - Cardiovascular exercise also improves your cholesterol levels and your goal is low intensity  (like walking and biking) 150 min/week to moderate/high intensity 75 min/week.

## 2022-10-18 NOTE — PROGRESS NOTES
[facility] Note    Chief Complaint      Chief Complaint   Patient presents with    Results     Patient here for lab results     History of Present Illness      Bianca Richard is a 31 y.o. female who presents today for Results (Patient here for lab results)    Past Medical History:  Past Medical History:   Diagnosis Date    Depression     Hx of abnormal cervical Pap smear      Past Surgical History:   has a past surgical history that includes Cholecystectomy.    Family History:  family history includes Hypertension in her father.     Social History:  Social History     Tobacco Use    Smoking status: Every Day     Packs/day: 0.50     Types: Cigarettes    Smokeless tobacco: Never   Substance Use Topics    Alcohol use: No    Drug use: No       I personally reviewed all past medical, surgical, social and family history.    Review of Systems   Constitutional:  Negative for chills, fever, malaise/fatigue and weight loss.   HENT:  Negative for congestion, ear discharge, ear pain, sinus pain and sore throat.    Eyes:  Negative for blurred vision, pain, discharge and redness.   Respiratory:  Negative for cough, hemoptysis, sputum production, shortness of breath, wheezing and stridor.    Cardiovascular:  Negative for chest pain, palpitations and leg swelling.   Gastrointestinal:  Negative for abdominal pain, blood in stool, constipation, diarrhea, nausea and vomiting.   Genitourinary:  Negative for dysuria, frequency and hematuria.   Musculoskeletal:  Negative for back pain, falls, joint pain, myalgias and neck pain.   Skin: Negative.  Negative for rash.   Neurological:  Negative for dizziness, tingling, focal weakness, seizures, weakness and headaches.   Endo/Heme/Allergies:  Negative for environmental allergies and polydipsia. Does not bruise/bleed easily.   Psychiatric/Behavioral:  Negative for depression and suicidal ideas. The patient is not nervous/anxious and does not have insomnia.    "    Medications:  Outpatient Encounter Medications as of 10/18/2022   Medication Sig Dispense Refill    HYDROcodone-acetaminophen (NORCO)  mg per tablet Take 1 tablet by mouth every 6 (six) hours as needed for Pain. 20 tablet 0    [DISCONTINUED] HYDROcodone-acetaminophen (NORCO) 5-325 mg per tablet Take by mouth.       No facility-administered encounter medications on file as of 10/18/2022.       Allergies:  Review of patient's allergies indicates:   Allergen Reactions    Amoxicillin Itching       Health Maintenance:  Immunization History   Administered Date(s) Administered    DTaP 01/28/1991, 04/22/1991, 09/09/1991, 05/03/1993, 08/04/1995    HIB 04/22/1991, 09/09/1991, 05/03/1993    Hepatitis B, Pediatric/Adolescent 09/22/2004, 11/03/2004, 04/01/2005    IPV 01/28/1991, 04/22/1991, 05/03/1993, 08/04/1995    MMR 05/03/1993, 08/04/1995    Td - PF (ADULT) 09/22/2004    Tdap 01/11/2018    Varicella 09/22/2004      Health Maintenance   Topic Date Due    Hepatitis C Screening  Never done    TETANUS VACCINE  01/11/2028    Lipid Panel  Completed        Physical Exam      Vital Signs  Temp: 98.3 °F (36.8 °C)  Temp src: Oral  Pulse: 73  Resp: 14  SpO2: 98 %  BP: (!) 101/59  BP Location: Right arm  Patient Position: Sitting  Pain Score: 0-No pain  Height and Weight  Height: 5' 9" (175.3 cm)  Weight: 86.2 kg (190 lb)  BSA (Calculated - sq m): 2.05 sq meters  BMI (Calculated): 28  Weight in (lb) to have BMI = 25: 168.9]    Physical Exam  Vitals reviewed.   Constitutional:       General: She is not in acute distress.     Appearance: Normal appearance. She is normal weight. She is not ill-appearing or toxic-appearing.   HENT:      Head: Normocephalic and atraumatic.      Right Ear: External ear normal.      Left Ear: External ear normal.      Nose: Nose normal.      Mouth/Throat:      Mouth: Mucous membranes are moist.      Pharynx: Oropharynx is clear.   Eyes:      Extraocular Movements: Extraocular movements intact.      " Conjunctiva/sclera: Conjunctivae normal.   Cardiovascular:      Rate and Rhythm: Normal rate and regular rhythm.      Pulses: Normal pulses.           Dorsalis pedis pulses are 2+ on the left side.        Posterior tibial pulses are 2+ on the left side.      Heart sounds: Normal heart sounds.   Pulmonary:      Effort: Pulmonary effort is normal. No respiratory distress.      Breath sounds: No stridor. No wheezing, rhonchi or rales.   Abdominal:      General: Abdomen is flat. There is no distension.      Palpations: Abdomen is soft. There is no mass.      Tenderness: There is no abdominal tenderness. There is no right CVA tenderness, left CVA tenderness or guarding.   Musculoskeletal:         General: No tenderness. Normal range of motion.      Cervical back: Normal range of motion and neck supple. No rigidity or tenderness.      Right lower leg: No edema.      Left lower leg: No edema.      Left foot: Normal range of motion.        Feet:    Feet:      Left foot:      Skin integrity: No ulcer, blister, skin breakdown, erythema, warmth or dry skin.      Comments: Right foot wrapped and dressed, sensation in toes intact. Secured in walker boot. Ambulating with crutches.  Skin:     General: Skin is warm and dry.      Capillary Refill: Capillary refill takes less than 2 seconds.   Neurological:      General: No focal deficit present.      Mental Status: She is alert and oriented to person, place, and time. Mental status is at baseline.      Motor: No weakness.      Gait: Gait normal.   Psychiatric:         Mood and Affect: Mood normal.         Behavior: Behavior normal.         Thought Content: Thought content normal.         Judgment: Judgment normal.      Laboratory:  CBC:  Recent Labs   Lab 07/03/20  2336 10/11/22  0843   WBC 7.4 5.63   RBC 3.90 L 4.04   Hemoglobin 12.1 L 12.5   Hematocrit 37.0 L 36.9 L   Platelets  --  241   MCV 94.9 91   MCH 31.0 30.9   MCHC 32.7 33.9     CMP:  Recent Labs   Lab 07/03/20  2806  10/11/22  0843   Glucose 82 89   Calcium 8.2 L 8.7   Albumin 3.5 3.6   Total Protein 6.5 6.4   Sodium 139 141   Potassium 4.0 3.8   CO2 30 29   Chloride 109 H 107   BUN 11 8   Alkaline Phosphatase 75 68   ALT 16 22   AST 9 L 16   Total Bilirubin 0.23 0.4     LIPIDS:  Recent Labs   Lab 10/11/22  0843   TSH 1.480   HDL 49   Cholesterol 138   Triglycerides 51   LDL Cholesterol 78.8   HDL/Cholesterol Ratio 35.5   Non-HDL Cholesterol 89   Total Cholesterol/HDL Ratio 2.8     TSH:  Recent Labs   Lab 10/11/22  0843   TSH 1.480     Assessment/Plan     Bianca Richard is a 31 y.o.female with:    Problem List Items Addressed This Visit          Derm    Ingrown right big toenail    Current Assessment & Plan     One toe has healed. Patient to follow up with podiatry.               Cardiac/Vascular    Mixed hyperlipidemia    Current Assessment & Plan     Current treatment:  Current Outpatient Medications on File Prior to Visit   Medication Sig Dispense Refill    HYDROcodone-acetaminophen (NORCO)  mg per tablet Take 1 tablet by mouth every 6 (six) hours as needed for Pain. 20 tablet 0     No current facility-administered medications on file prior to visit.     Side effects from current treatment: none  Exercise: not active  Diet: cardiac diet  Family history of CAD: No  Family history of CVA: No    Lab Results   Component Value Date    CHOL 138 10/11/2022     Lab Results   Component Value Date    HDL 49 10/11/2022     Lab Results   Component Value Date    LDLCALC 78.8 10/11/2022     Lab Results   Component Value Date    TRIG 51 10/11/2022     Lab Results   Component Value Date    CHOLHDL 35.5 10/11/2022   - To help improve your cardiovascular health and reduce your risk of stroke or heart attack, the following healthy lifestyle changes are recommended.   - Choose whole food items, fresh/frozen fruits and vegetables and unprocessed meats.  - Avoid processed food items, cold cuts, canned foods in sugary and high sodium  preservatives.   - Reduce intake of highly refined carbohydrates or white starchy foods, white bread, white flour pasta, sugary cereals, sugary drinks, sweet tea, soda including diet, candies, cakes and donuts.    - Reduce or avoid saturated fats (fats from animals and processed oils like palm oil, peanut oil, vegetable oil) and fried food items.   - Increase healthy fats like omega-3 fish oil, fatty fish (salmon, mackerel, sardine etc), olive oil, avocado, raw nuts etc.   - Increase fiber intake with daily goal of 6-8 servings of vegetables.  - Remember to maintain daily adequate hydration with water 1.5 to 2 liters fluid volume.    - Daily physical activities, start slow with 10-15 minutes of walk daily, then increase as tolerated to twice daily.   - Cardiovascular exercise also improves your cholesterol levels and your goal is low intensity (like walking and biking) 150 min/week to moderate/high intensity 75 min/week.              ID    Routine screening for STI (sexually transmitted infection)    Relevant Orders    HIV 1/2 Ag/Ab (4th Gen)    RPR    Hepatitis C Antibody    Herpes simplex Virus (HSV) Type 1 & 2 DNA by PCR       Oncology    Anemia - Primary    Current Assessment & Plan     Low normal levels of iron and vitamin B12. Counseled patient on food groups to incorporate in daily diet. Consider supplementation with Blood Builders from [employer] containing folic acid, vitamin B12 and iron. Over denies symptoms. Patient still has oral ulcers that concern her. Will follow up with STI screen.             Endocrine    BMI 28.0-28.9,adult    Current Assessment & Plan     Wt Readings from Last 3 Encounters:   10/18/22 1403 86.2 kg (190 lb)   10/17/22 0847 86.2 kg (190 lb)   10/14/22 1832 87.5 kg (193 lb)   Recommendations:   Stay physically active. As tolerated alternate resistance training with stretching and cardio. Goal of 150 minutes per week of moderate intensity activity or 7,500 - 10,000 steps per day.  Follow the Mediterranean Diet. Include whole fresh fruits, vegetables, olive oil, seeds, nuts, whole grains, cold water fish, salmon, mackerel and lean cuts of meat.  Do not drink sugary/diet carbonated beverages. Decrease portion sizes slightly which will result in an approximately 500-calorie deficit. Avoid fast or fried and processed food, especially canned foods. Avoid refined carbohydrates, white starchy foods, flour, white potato, bread, muffins, and cakes. Consider substituting one meal a day with a meal replacement such as Slim fast, lean cuisine, or weight watcher's. Follow a healthy diet that includes enough calcium, vitamin D and proteins for bone health.              Orthopedic    Joint swelling of lower leg    Relevant Orders    C-Reactive Protein    Sedimentation rate       Other    Nondisp fx of 5th metatarsal bone, unsp ft, init for opn fx    Current Assessment & Plan     Follow up ortho, now in walker boot to keep right foot immobile and non weight bearing.   Per patient, requires to be immobile for 10-12 weeks. Pain well controlled. Work has been adjusted where she can be seated for the majority of time.   - f/u ortho recommendations          Other than changes above, cont current medications and maintain follow up with specialists.  No follow-ups on file.    Future Appointments   Date Time Provider Department Center   10/25/2022  2:00 PM Chidi Spangler NP [facility] ORTHO [facility]   1/18/2023  2:00 PM Reinaldo Camara DO [facility] PRICAR [facility]       Kazumi G Yoshinaga, DO [facility]

## 2022-10-18 NOTE — ASSESSMENT & PLAN NOTE
Low normal levels of iron and vitamin B12. Counseled patient on food groups to incorporate in daily diet. Consider supplementation with Blood Builders from [employer] containing folic acid, vitamin B12 and iron. Over denies symptoms. Patient still has oral ulcers that concern her. Will follow up with STI screen.

## 2022-10-19 LAB
25(OH)D3+25(OH)D2 SERPL-MCNC: 29 NG/ML (ref 30–96)
HCV AB SERPL QL IA: NORMAL
HIV 1+2 AB+HIV1 P24 AG SERPL QL IA: NORMAL
RPR SER QL: NORMAL

## 2022-10-21 LAB
HSV-1 DNA BY PCR: NEGATIVE
HSV-2 DNA BY PCR: NEGATIVE

## 2022-10-24 DIAGNOSIS — S92.351A CLOSED FRACTURE OF BASE OF FIFTH METATARSAL BONE OF RIGHT FOOT, INITIAL ENCOUNTER: Primary | ICD-10-CM

## 2022-10-25 ENCOUNTER — OFFICE VISIT (OUTPATIENT)
Dept: ORTHOPEDICS | Facility: CLINIC | Age: 32
End: 2022-10-25
Payer: MEDICAID

## 2022-10-25 ENCOUNTER — HOSPITAL ENCOUNTER (OUTPATIENT)
Dept: RADIOLOGY | Facility: HOSPITAL | Age: 32
Discharge: HOME | End: 2022-10-25
Attending: NURSE PRACTITIONER
Payer: MEDICAID

## 2022-10-25 DIAGNOSIS — S92.351A CLOSED FRACTURE OF BASE OF FIFTH METATARSAL BONE OF RIGHT FOOT, INITIAL ENCOUNTER: Primary | ICD-10-CM

## 2022-10-25 DIAGNOSIS — S92.354D CLOSED NONDISPLACED FRACTURE OF FIFTH METATARSAL BONE OF RIGHT FOOT WITH ROUTINE HEALING, SUBSEQUENT ENCOUNTER: ICD-10-CM

## 2022-10-25 DIAGNOSIS — S92.351A CLOSED FRACTURE OF BASE OF FIFTH METATARSAL BONE OF RIGHT FOOT, INITIAL ENCOUNTER: ICD-10-CM

## 2022-10-25 PROCEDURE — 99024 POSTOP FOLLOW-UP VISIT: CPT | Mod: ,,, | Performed by: NURSE PRACTITIONER

## 2022-10-25 PROCEDURE — 73630 X-RAY EXAM OF FOOT: CPT | Mod: TC,RT

## 2022-10-25 PROCEDURE — 1159F PR MEDICATION LIST DOCUMENTED IN MEDICAL RECORD: ICD-10-PCS | Mod: CPTII,,, | Performed by: NURSE PRACTITIONER

## 2022-10-25 PROCEDURE — 99024 PR POST-OP FOLLOW-UP VISIT: ICD-10-PCS | Mod: ,,, | Performed by: NURSE PRACTITIONER

## 2022-10-25 PROCEDURE — 1160F RVW MEDS BY RX/DR IN RCRD: CPT | Mod: CPTII,,, | Performed by: NURSE PRACTITIONER

## 2022-10-25 PROCEDURE — 99212 OFFICE O/P EST SF 10 MIN: CPT | Mod: PBBFAC | Performed by: NURSE PRACTITIONER

## 2022-10-25 PROCEDURE — 99999 PR PBB SHADOW E&M-EST. PATIENT-LVL II: CPT | Mod: PBBFAC,,, | Performed by: NURSE PRACTITIONER

## 2022-10-25 PROCEDURE — 1160F PR REVIEW ALL MEDS BY PRESCRIBER/CLIN PHARMACIST DOCUMENTED: ICD-10-PCS | Mod: CPTII,,, | Performed by: NURSE PRACTITIONER

## 2022-10-25 PROCEDURE — 1159F MED LIST DOCD IN RCRD: CPT | Mod: CPTII,,, | Performed by: NURSE PRACTITIONER

## 2022-10-25 PROCEDURE — 99999 PR PBB SHADOW E&M-EST. PATIENT-LVL II: ICD-10-PCS | Mod: PBBFAC,,, | Performed by: NURSE PRACTITIONER

## 2022-10-25 RX ORDER — HYDROCODONE BITARTRATE AND ACETAMINOPHEN 5; 325 MG/1; MG/1
1 TABLET ORAL EVERY 8 HOURS PRN
Qty: 20 TABLET | Refills: 0 | Status: SHIPPED | OUTPATIENT
Start: 2022-10-25 | End: 2023-04-05

## 2022-10-25 NOTE — PROGRESS NOTES
Subjective:      Follow up: Right foot FX     Bianca Richard is a 31 y.o. female who presents with right foot fracture involving base of the fifth metatarsal. Onset of the injury was  10/10/22 . She presents on crutches in her walker boot. She rates her pain as 5/10. Current symptoms include: ability to bear weight, but with pain. Aggravating factors: any weight bearing. Symptoms have been well-controlled.       Review of Systems  Review of Systems   Constitutional: Negative.  Negative for fever.   Musculoskeletal:  Positive for joint pain (RT foot).   Skin: Negative.    Neurological: Negative.    Psychiatric/Behavioral: Negative.     All other systems reviewed and are negative.     Objective:   Neurologically intact.     Gait Crutch assisted.  Right foot:  Soft tissue swelling and bruising noted over the lateral mid-foot- decreased.   Tenderness of the base of the fifth metatarsal  Non-tender ankle joint. ROM foot decreased.   Sensation intact distally.   Left foot:  Normal exam, no swelling, tenderness, instability; ligaments intact, full Range of motion of all ankle/foot joints      Cap refill brisk     Imaging:  X-ray of the right foot: Imaging reviewed   Previously described comminuted fracture at the base of the 5th metatarsal remains with the fracture planes similar in alignment.    No significant surrounding callus formation is appreciated.     Assessment:      Nondisplaced fracture involving the base of the right fifth metatarsal.     Plan:      Radiographs are stable.   Rest, ice, compression, and elevation (RICE) therapy.  Placed in a walker boot for fracture immobilization and rest, PWB as directed with crutches.   Pain meds as directed. (Norco 5/325mg, instructed on dosing and side effects.   RTC two weeks for repeat radiographs.   Questions answered.

## 2022-11-07 DIAGNOSIS — S92.354D CLOSED NONDISPLACED FRACTURE OF FIFTH METATARSAL BONE OF RIGHT FOOT WITH ROUTINE HEALING, SUBSEQUENT ENCOUNTER: Primary | ICD-10-CM

## 2022-11-08 ENCOUNTER — HOSPITAL ENCOUNTER (OUTPATIENT)
Dept: RADIOLOGY | Facility: HOSPITAL | Age: 32
Discharge: HOME | End: 2022-11-08
Attending: NURSE PRACTITIONER
Payer: MEDICAID

## 2022-11-08 ENCOUNTER — OFFICE VISIT (OUTPATIENT)
Dept: ORTHOPEDICS | Facility: CLINIC | Age: 32
End: 2022-11-08
Payer: MEDICAID

## 2022-11-08 DIAGNOSIS — S92.354D CLOSED NONDISPLACED FRACTURE OF FIFTH METATARSAL BONE OF RIGHT FOOT WITH ROUTINE HEALING, SUBSEQUENT ENCOUNTER: Primary | ICD-10-CM

## 2022-11-08 DIAGNOSIS — S92.354D CLOSED NONDISPLACED FRACTURE OF FIFTH METATARSAL BONE OF RIGHT FOOT WITH ROUTINE HEALING, SUBSEQUENT ENCOUNTER: ICD-10-CM

## 2022-11-08 PROCEDURE — 1160F RVW MEDS BY RX/DR IN RCRD: CPT | Mod: CPTII,,, | Performed by: NURSE PRACTITIONER

## 2022-11-08 PROCEDURE — 99024 POSTOP FOLLOW-UP VISIT: CPT | Mod: ,,, | Performed by: NURSE PRACTITIONER

## 2022-11-08 PROCEDURE — 1160F PR REVIEW ALL MEDS BY PRESCRIBER/CLIN PHARMACIST DOCUMENTED: ICD-10-PCS | Mod: CPTII,,, | Performed by: NURSE PRACTITIONER

## 2022-11-08 PROCEDURE — 99999 PR PBB SHADOW E&M-EST. PATIENT-LVL II: ICD-10-PCS | Mod: PBBFAC,,, | Performed by: NURSE PRACTITIONER

## 2022-11-08 PROCEDURE — 99212 OFFICE O/P EST SF 10 MIN: CPT | Mod: PBBFAC | Performed by: NURSE PRACTITIONER

## 2022-11-08 PROCEDURE — 73630 X-RAY EXAM OF FOOT: CPT | Mod: TC,RT

## 2022-11-08 PROCEDURE — 1159F MED LIST DOCD IN RCRD: CPT | Mod: CPTII,,, | Performed by: NURSE PRACTITIONER

## 2022-11-08 PROCEDURE — 99999 PR PBB SHADOW E&M-EST. PATIENT-LVL II: CPT | Mod: PBBFAC,,, | Performed by: NURSE PRACTITIONER

## 2022-11-08 PROCEDURE — 1159F PR MEDICATION LIST DOCUMENTED IN MEDICAL RECORD: ICD-10-PCS | Mod: CPTII,,, | Performed by: NURSE PRACTITIONER

## 2022-11-08 PROCEDURE — 99024 PR POST-OP FOLLOW-UP VISIT: ICD-10-PCS | Mod: ,,, | Performed by: NURSE PRACTITIONER

## 2022-11-08 NOTE — PROGRESS NOTES
Subjective:      Follow up: Right foot FX      Bianca Richard is a 31 y.o. female who presents for a 4 week follow up for a right foot fracture involving base of the fifth metatarsal. Onset of the injury was  10/10/22 . She presents on crutches in her walker boot. She rates her pain as 3/10. Current symptoms include: ability to bear weight, but with pain. Aggravating factors: any weight bearing. Symptoms have been well-controlled.       Review of Systems  Review of Systems   Constitutional: Negative.  Negative for fever.   Musculoskeletal:  Positive for joint pain (RT foot).   Skin: Negative.    Neurological: Negative.    Psychiatric/Behavioral: Negative.     All other systems reviewed and are negative.     Objective:   Neurologically intact.     Gait Crutch assisted.  Right foot:  Soft tissue swelling and bruising noted over the lateral mid-foot is minimal.   Tenderness of the base of the fifth metatarsal is mild.  Non-tender ankle joint. ROM foot decreased.   Sensation intact distally.   Left foot:  Normal exam, no swelling, tenderness, instability; ligaments intact, full Range of motion of all ankle/foot joints      Cap refill brisk     Imaging:  X-ray of the right foot: Imaging reviewed   Previously described fracture at the base of the 5th metatarsal is somewhat less discrete on the current study, but persistent fracture plane is faintly noted.      Assessment:      Nondisplaced fracture involving the base of the right fifth metatarsal.     Plan:      Radiographs are stable with mild interval healing.  Rest, ice, compression, and elevation (RICE) therapy.  Continue in a walker boot for fracture immobilization and rest, PWB as directed with crutches. Will start advancing weight bearing as directed.  RTC four weeks for repeat radiographs.   Questions answered.

## 2022-12-02 DIAGNOSIS — S92.354D CLOSED NONDISPLACED FRACTURE OF FIFTH METATARSAL BONE OF RIGHT FOOT WITH ROUTINE HEALING, SUBSEQUENT ENCOUNTER: Primary | ICD-10-CM

## 2022-12-05 ENCOUNTER — HOSPITAL ENCOUNTER (OUTPATIENT)
Dept: RADIOLOGY | Facility: HOSPITAL | Age: 32
Discharge: HOME | End: 2022-12-05
Attending: NURSE PRACTITIONER
Payer: MEDICAID

## 2022-12-05 ENCOUNTER — OFFICE VISIT (OUTPATIENT)
Dept: ORTHOPEDICS | Facility: CLINIC | Age: 32
End: 2022-12-05
Payer: MEDICAID

## 2022-12-05 DIAGNOSIS — S92.354D CLOSED NONDISPLACED FRACTURE OF FIFTH METATARSAL BONE OF RIGHT FOOT WITH ROUTINE HEALING, SUBSEQUENT ENCOUNTER: Primary | ICD-10-CM

## 2022-12-05 DIAGNOSIS — S92.354D CLOSED NONDISPLACED FRACTURE OF FIFTH METATARSAL BONE OF RIGHT FOOT WITH ROUTINE HEALING, SUBSEQUENT ENCOUNTER: ICD-10-CM

## 2022-12-05 PROCEDURE — 73630 X-RAY EXAM OF FOOT: CPT | Mod: TC,RT

## 2022-12-05 PROCEDURE — 99024 PR POST-OP FOLLOW-UP VISIT: ICD-10-PCS | Mod: ,,, | Performed by: NURSE PRACTITIONER

## 2022-12-05 PROCEDURE — 1160F PR REVIEW ALL MEDS BY PRESCRIBER/CLIN PHARMACIST DOCUMENTED: ICD-10-PCS | Mod: CPTII,,, | Performed by: NURSE PRACTITIONER

## 2022-12-05 PROCEDURE — 1160F RVW MEDS BY RX/DR IN RCRD: CPT | Mod: CPTII,,, | Performed by: NURSE PRACTITIONER

## 2022-12-05 PROCEDURE — 1159F PR MEDICATION LIST DOCUMENTED IN MEDICAL RECORD: ICD-10-PCS | Mod: CPTII,,, | Performed by: NURSE PRACTITIONER

## 2022-12-05 PROCEDURE — 99999 PR PBB SHADOW E&M-EST. PATIENT-LVL II: ICD-10-PCS | Mod: PBBFAC,,, | Performed by: NURSE PRACTITIONER

## 2022-12-05 PROCEDURE — 99024 POSTOP FOLLOW-UP VISIT: CPT | Mod: ,,, | Performed by: NURSE PRACTITIONER

## 2022-12-05 PROCEDURE — 1159F MED LIST DOCD IN RCRD: CPT | Mod: CPTII,,, | Performed by: NURSE PRACTITIONER

## 2022-12-05 PROCEDURE — 99212 OFFICE O/P EST SF 10 MIN: CPT | Mod: PBBFAC | Performed by: NURSE PRACTITIONER

## 2022-12-05 PROCEDURE — 99999 PR PBB SHADOW E&M-EST. PATIENT-LVL II: CPT | Mod: PBBFAC,,, | Performed by: NURSE PRACTITIONER

## 2022-12-05 NOTE — PROGRESS NOTES
Subjective:      Follow up: Right foot FX      Bianca Richard is a 32 y.o. female who presents for a 8 week follow up for a right foot fracture involving base of the fifth metatarsal. Onset of the injury was  10/10/22 . She presents on one crutch in her walker boot and states that she is doing well. She rates her pain as 0/10.  Aggravating factors: any weight bearing. Symptoms have been well-controlled.       Review of Systems  Review of Systems   Constitutional: Negative.  Negative for fever.   Musculoskeletal:  Positive for joint pain (RT foot).   Skin: Negative.    Neurological: Negative.    Psychiatric/Behavioral: Negative.     All other systems reviewed and are negative.     Objective:   Neurologically intact.     Gait Crutch assisted.  Right foot:  Resolved swelling and bruising over the lateral mid-foot.   Tenderness of the base of the fifth metatarsal is mild.  Non-tender ankle joint. ROM foot decreased.   Sensation intact distally.   Left foot:  Normal exam, no swelling, tenderness, instability; ligaments intact, full Range of motion of all ankle/foot joints      Cap refill brisk     Imaging:  X-ray of the right foot: Imaging reviewed   nondisplaced fracture involving the base of the right 5th metatarsal.  The fracture planes are less well visualized, consistent with some interval healing.   Assessment:      Nondisplaced fracture involving the base of the right fifth metatarsal.     Plan:      Radiographs are stable with interval healing.  Continue in a walker boot for 2 more weeks, WBAT. May transition to a normal show in 2 weeks as directed.   RTC four weeks for a final check.   Questions answered.

## 2022-12-29 DIAGNOSIS — S92.354D CLOSED NONDISPLACED FRACTURE OF FIFTH METATARSAL BONE OF RIGHT FOOT WITH ROUTINE HEALING, SUBSEQUENT ENCOUNTER: Primary | ICD-10-CM

## 2023-01-03 ENCOUNTER — HOSPITAL ENCOUNTER (OUTPATIENT)
Dept: RADIOLOGY | Facility: HOSPITAL | Age: 33
Discharge: HOME | End: 2023-01-03
Attending: NURSE PRACTITIONER
Payer: MEDICAID

## 2023-01-03 ENCOUNTER — OFFICE VISIT (OUTPATIENT)
Dept: ORTHOPEDICS | Facility: CLINIC | Age: 33
End: 2023-01-03
Payer: MEDICAID

## 2023-01-03 DIAGNOSIS — S92.354D CLOSED NONDISPLACED FRACTURE OF FIFTH METATARSAL BONE OF RIGHT FOOT WITH ROUTINE HEALING, SUBSEQUENT ENCOUNTER: ICD-10-CM

## 2023-01-03 DIAGNOSIS — S92.354D CLOSED NONDISPLACED FRACTURE OF FIFTH METATARSAL BONE OF RIGHT FOOT WITH ROUTINE HEALING, SUBSEQUENT ENCOUNTER: Primary | ICD-10-CM

## 2023-01-03 PROCEDURE — 99024 POSTOP FOLLOW-UP VISIT: CPT | Mod: ,,, | Performed by: NURSE PRACTITIONER

## 2023-01-03 PROCEDURE — 99212 OFFICE O/P EST SF 10 MIN: CPT | Mod: PBBFAC | Performed by: NURSE PRACTITIONER

## 2023-01-03 PROCEDURE — 99999 PR PBB SHADOW E&M-EST. PATIENT-LVL II: ICD-10-PCS | Mod: PBBFAC,,, | Performed by: NURSE PRACTITIONER

## 2023-01-03 PROCEDURE — 1159F MED LIST DOCD IN RCRD: CPT | Mod: CPTII,,, | Performed by: NURSE PRACTITIONER

## 2023-01-03 PROCEDURE — 1160F PR REVIEW ALL MEDS BY PRESCRIBER/CLIN PHARMACIST DOCUMENTED: ICD-10-PCS | Mod: CPTII,,, | Performed by: NURSE PRACTITIONER

## 2023-01-03 PROCEDURE — 99999 PR PBB SHADOW E&M-EST. PATIENT-LVL II: CPT | Mod: PBBFAC,,, | Performed by: NURSE PRACTITIONER

## 2023-01-03 PROCEDURE — 73630 X-RAY EXAM OF FOOT: CPT | Mod: TC,RT

## 2023-01-03 PROCEDURE — 99024 PR POST-OP FOLLOW-UP VISIT: ICD-10-PCS | Mod: ,,, | Performed by: NURSE PRACTITIONER

## 2023-01-03 PROCEDURE — 1160F RVW MEDS BY RX/DR IN RCRD: CPT | Mod: CPTII,,, | Performed by: NURSE PRACTITIONER

## 2023-01-03 PROCEDURE — 1159F PR MEDICATION LIST DOCUMENTED IN MEDICAL RECORD: ICD-10-PCS | Mod: CPTII,,, | Performed by: NURSE PRACTITIONER

## 2023-01-09 ENCOUNTER — PATIENT MESSAGE (OUTPATIENT)
Dept: ADMINISTRATIVE | Facility: HOSPITAL | Age: 33
End: 2023-01-09
Payer: MEDICAID

## 2023-02-14 ENCOUNTER — OFFICE VISIT (OUTPATIENT)
Dept: ORTHOPEDICS | Facility: CLINIC | Age: 33
End: 2023-02-14
Payer: MEDICAID

## 2023-02-14 DIAGNOSIS — S92.354D CLOSED NONDISPLACED FRACTURE OF FIFTH METATARSAL BONE OF RIGHT FOOT WITH ROUTINE HEALING, SUBSEQUENT ENCOUNTER: Primary | ICD-10-CM

## 2023-02-14 PROCEDURE — 1160F PR REVIEW ALL MEDS BY PRESCRIBER/CLIN PHARMACIST DOCUMENTED: ICD-10-PCS | Mod: CPTII,,, | Performed by: NURSE PRACTITIONER

## 2023-02-14 PROCEDURE — 99999 PR PBB SHADOW E&M-EST. PATIENT-LVL II: CPT | Mod: PBBFAC,,, | Performed by: NURSE PRACTITIONER

## 2023-02-14 PROCEDURE — 99213 PR OFFICE/OUTPT VISIT, EST, LEVL III, 20-29 MIN: ICD-10-PCS | Mod: S$PBB,,, | Performed by: NURSE PRACTITIONER

## 2023-02-14 PROCEDURE — 99213 OFFICE O/P EST LOW 20 MIN: CPT | Mod: S$PBB,,, | Performed by: NURSE PRACTITIONER

## 2023-02-14 PROCEDURE — 1159F MED LIST DOCD IN RCRD: CPT | Mod: CPTII,,, | Performed by: NURSE PRACTITIONER

## 2023-02-14 PROCEDURE — 99212 OFFICE O/P EST SF 10 MIN: CPT | Mod: PBBFAC | Performed by: NURSE PRACTITIONER

## 2023-02-14 PROCEDURE — 1159F PR MEDICATION LIST DOCUMENTED IN MEDICAL RECORD: ICD-10-PCS | Mod: CPTII,,, | Performed by: NURSE PRACTITIONER

## 2023-02-14 PROCEDURE — 99999 PR PBB SHADOW E&M-EST. PATIENT-LVL II: ICD-10-PCS | Mod: PBBFAC,,, | Performed by: NURSE PRACTITIONER

## 2023-02-14 PROCEDURE — 1160F RVW MEDS BY RX/DR IN RCRD: CPT | Mod: CPTII,,, | Performed by: NURSE PRACTITIONER

## 2023-02-14 NOTE — PROGRESS NOTES
Subjective:      Follow up: Right foot FX      Bianca Richard is a 32 y.o. female who presents for a 4 months follow up for a right foot fracture involving base of the fifth metatarsal. Onset of the injury was 10/10/22 . She presents in normal shoe wear and states that she is doing well. She rates her pain as none.      Review of Systems  Constitutional: Negative.  Negative for fever.   Musculoskeletal:  Positive for joint pain (RT foot).   Skin: Negative.    Neurological: Negative.    Psychiatric/Behavioral: Negative.     All other systems reviewed and are negative.     Objective:   Neurologically intact.     Gait Normal  Right foot:  Resolved swelling and bruising over the lateral mid-foot.   There is no tenderness over the base of the fifth metatarsal is minimal.  Non-tender ankle joint. ROM foot full.  Sensation intact distally.   Left foot:  Normal exam, no swelling, tenderness, instability; ligaments intact, full Range of motion of all ankle/foot joints      Cap refill brisk     Imaging:  None today  Assessment:      Fracture involving the base of the right fifth metatarsal.     Plan:      She is clinically improved.  Continue in normal shoewear, WBAT.  RTC prn.  Questions answered.

## 2023-03-08 ENCOUNTER — HOSPITAL ENCOUNTER (EMERGENCY)
Facility: HOSPITAL | Age: 33
Discharge: HOME | End: 2023-03-08
Attending: EMERGENCY MEDICINE
Payer: MEDICAID

## 2023-03-08 VITALS
DIASTOLIC BLOOD PRESSURE: 71 MMHG | HEIGHT: 69 IN | OXYGEN SATURATION: 99 % | BODY MASS INDEX: 28.14 KG/M2 | SYSTOLIC BLOOD PRESSURE: 113 MMHG | TEMPERATURE: 99 F | RESPIRATION RATE: 18 BRPM | WEIGHT: 190 LBS | HEART RATE: 102 BPM

## 2023-03-08 DIAGNOSIS — J02.9 PHARYNGITIS, UNSPECIFIED ETIOLOGY: Primary | ICD-10-CM

## 2023-03-08 LAB — GROUP A STREP, MOLECULAR: NEGATIVE

## 2023-03-08 PROCEDURE — 99284 EMERGENCY DEPT VISIT MOD MDM: CPT

## 2023-03-08 PROCEDURE — 63600175 PHARM REV CODE 636 W HCPCS: Performed by: CLINICAL NURSE SPECIALIST

## 2023-03-08 PROCEDURE — 96372 THER/PROPH/DIAG INJ SC/IM: CPT | Performed by: CLINICAL NURSE SPECIALIST

## 2023-03-08 PROCEDURE — 87651 STREP A DNA AMP PROBE: CPT | Performed by: CLINICAL NURSE SPECIALIST

## 2023-03-08 RX ORDER — AZITHROMYCIN 250 MG/1
250 TABLET, FILM COATED ORAL DAILY
Qty: 6 TABLET | Refills: 0 | Status: SHIPPED | OUTPATIENT
Start: 2023-03-08 | End: 2023-04-05

## 2023-03-08 RX ORDER — DEXAMETHASONE SODIUM PHOSPHATE 4 MG/ML
8 INJECTION, SOLUTION INTRA-ARTICULAR; INTRALESIONAL; INTRAMUSCULAR; INTRAVENOUS; SOFT TISSUE
Status: COMPLETED | OUTPATIENT
Start: 2023-03-08 | End: 2023-03-08

## 2023-03-08 RX ORDER — PREDNISONE 20 MG/1
20 TABLET ORAL DAILY
Qty: 5 TABLET | Refills: 0 | Status: SHIPPED | OUTPATIENT
Start: 2023-03-08 | End: 2023-03-13

## 2023-03-08 RX ADMIN — DEXAMETHASONE SODIUM PHOSPHATE 8 MG: 4 INJECTION, SOLUTION INTRA-ARTICULAR; INTRALESIONAL; INTRAMUSCULAR; INTRAVENOUS; SOFT TISSUE at 03:03

## 2023-03-08 NOTE — Clinical Note
"Bianca Aquino" Jose Manuel was seen and treated in our emergency department on 3/8/2023.  She may return to work on 03/10/2023.       If you have any questions or concerns, please don't hesitate to call.      Chu Buck MD"

## 2023-03-08 NOTE — ED PROVIDER NOTES
Encounter Date: 3/8/2023       History     Chief Complaint   Patient presents with    Sore Throat     Patient reports sore throat since yesterday. Reports home covid test negative.      32-year-old female presents to the emergency room with sore throat since yesterday.  Negative home COVID test.    Review of patient's allergies indicates:   Allergen Reactions    Amoxicillin Itching     Past Medical History:   Diagnosis Date    Hx of abnormal cervical Pap smear      Past Surgical History:   Procedure Laterality Date    CHOLECYSTECTOMY       Family History   Problem Relation Age of Onset    Hypertension Father      Social History     Tobacco Use    Smoking status: Every Day     Packs/day: 0.50     Types: Cigarettes    Smokeless tobacco: Never   Substance Use Topics    Alcohol use: No    Drug use: No     Review of Systems   Constitutional:  Negative for fever.   HENT:  Positive for sore throat.    Respiratory:  Negative for shortness of breath.    Cardiovascular:  Negative for chest pain.   Gastrointestinal:  Negative for nausea.   Genitourinary:  Negative for dysuria.   Musculoskeletal:  Negative for back pain.   Skin:  Negative for rash.   Neurological:  Negative for weakness.   Hematological:  Does not bruise/bleed easily.   All other systems reviewed and are negative.    Physical Exam     Initial Vitals [03/08/23 1455]   BP Pulse Resp Temp SpO2   113/71 102 18 98.8 °F (37.1 °C) 99 %      MAP       --         Physical Exam    Nursing note and vitals reviewed.  Constitutional: She appears well-developed and well-nourished.   HENT:   Head: Normocephalic and atraumatic.   Mouth/Throat: Posterior oropharyngeal edema and posterior oropharyngeal erythema present.   Eyes: Pupils are equal, round, and reactive to light.   Neck:   Normal range of motion.  Cardiovascular:  Normal rate and regular rhythm.           Pulmonary/Chest: Breath sounds normal.   Abdominal: Abdomen is soft. Bowel sounds are normal.   Musculoskeletal:          General: Normal range of motion.      Cervical back: Normal range of motion.     Neurological: She is alert and oriented to person, place, and time.   Skin: Skin is warm and dry.   Psychiatric: She has a normal mood and affect.       ED Course   Procedures  Labs Reviewed   GROUP A STREP, MOLECULAR          Imaging Results    None          Medications   dexAMETHasone injection 8 mg (8 mg Intramuscular Given 3/8/23 2236)     Medical Decision Making:   Differential Diagnosis:   Strep throat, pharyngitis, URI  Clinical Tests:   Lab Tests: Ordered and Reviewed                        Clinical Impression:   Final diagnoses:  [J02.9] Pharyngitis, unspecified etiology (Primary)        ED Disposition Condition    Discharge Stable          ED Prescriptions       Medication Sig Dispense Start Date End Date Auth. Provider    azithromycin (Z-DANIEL) 250 MG tablet Take 1 tablet (250 mg total) by mouth once daily. Take first 2 tablets together, then 1 every day until finished. 6 tablet 3/8/2023 -- Natalie English NP    predniSONE (DELTASONE) 20 MG tablet Take 1 tablet (20 mg total) by mouth once daily. for 5 days 5 tablet 3/8/2023 3/13/2023 Natalie English NP          Follow-up Information       Follow up With Specialties Details Why Contact Info    Reinaldo Camara, DO Family Medicine  As needed [address] [facility]  [address] [place] [address]  [phone]               Natalie English NP  03/08/23 7887

## 2023-04-05 ENCOUNTER — OFFICE VISIT (OUTPATIENT)
Dept: PRIMARY CARE CLINIC | Facility: CLINIC | Age: 33
End: 2023-04-05
Payer: MEDICAID

## 2023-04-05 VITALS
HEART RATE: 75 BPM | RESPIRATION RATE: 18 BRPM | WEIGHT: 195 LBS | SYSTOLIC BLOOD PRESSURE: 101 MMHG | HEIGHT: 69 IN | OXYGEN SATURATION: 99 % | TEMPERATURE: 98 F | BODY MASS INDEX: 28.88 KG/M2 | DIASTOLIC BLOOD PRESSURE: 58 MMHG

## 2023-04-05 DIAGNOSIS — J06.9 UPPER RESPIRATORY TRACT INFECTION, UNSPECIFIED TYPE: Primary | ICD-10-CM

## 2023-04-05 PROCEDURE — 99213 OFFICE O/P EST LOW 20 MIN: CPT | Mod: S$PBB,,, | Performed by: STUDENT IN AN ORGANIZED HEALTH CARE EDUCATION/TRAINING PROGRAM

## 2023-04-05 PROCEDURE — 99999 PR PBB SHADOW E&M-EST. PATIENT-LVL III: CPT | Mod: PBBFAC,,, | Performed by: STUDENT IN AN ORGANIZED HEALTH CARE EDUCATION/TRAINING PROGRAM

## 2023-04-05 PROCEDURE — 3078F DIAST BP <80 MM HG: CPT | Mod: CPTII,,, | Performed by: STUDENT IN AN ORGANIZED HEALTH CARE EDUCATION/TRAINING PROGRAM

## 2023-04-05 PROCEDURE — 3008F BODY MASS INDEX DOCD: CPT | Mod: CPTII,,, | Performed by: STUDENT IN AN ORGANIZED HEALTH CARE EDUCATION/TRAINING PROGRAM

## 2023-04-05 PROCEDURE — 99213 OFFICE O/P EST LOW 20 MIN: CPT | Mod: PBBFAC,PN | Performed by: STUDENT IN AN ORGANIZED HEALTH CARE EDUCATION/TRAINING PROGRAM

## 2023-04-05 PROCEDURE — 1160F RVW MEDS BY RX/DR IN RCRD: CPT | Mod: CPTII,,, | Performed by: STUDENT IN AN ORGANIZED HEALTH CARE EDUCATION/TRAINING PROGRAM

## 2023-04-05 PROCEDURE — 3074F SYST BP LT 130 MM HG: CPT | Mod: CPTII,,, | Performed by: STUDENT IN AN ORGANIZED HEALTH CARE EDUCATION/TRAINING PROGRAM

## 2023-04-05 PROCEDURE — 1159F MED LIST DOCD IN RCRD: CPT | Mod: CPTII,,, | Performed by: STUDENT IN AN ORGANIZED HEALTH CARE EDUCATION/TRAINING PROGRAM

## 2023-04-05 PROCEDURE — 99999 PR PBB SHADOW E&M-EST. PATIENT-LVL III: ICD-10-PCS | Mod: PBBFAC,,, | Performed by: STUDENT IN AN ORGANIZED HEALTH CARE EDUCATION/TRAINING PROGRAM

## 2023-04-05 PROCEDURE — 99213 PR OFFICE/OUTPT VISIT, EST, LEVL III, 20-29 MIN: ICD-10-PCS | Mod: S$PBB,,, | Performed by: STUDENT IN AN ORGANIZED HEALTH CARE EDUCATION/TRAINING PROGRAM

## 2023-04-05 PROCEDURE — 3008F PR BODY MASS INDEX (BMI) DOCUMENTED: ICD-10-PCS | Mod: CPTII,,, | Performed by: STUDENT IN AN ORGANIZED HEALTH CARE EDUCATION/TRAINING PROGRAM

## 2023-04-05 PROCEDURE — 3078F PR MOST RECENT DIASTOLIC BLOOD PRESSURE < 80 MM HG: ICD-10-PCS | Mod: CPTII,,, | Performed by: STUDENT IN AN ORGANIZED HEALTH CARE EDUCATION/TRAINING PROGRAM

## 2023-04-05 PROCEDURE — 3074F PR MOST RECENT SYSTOLIC BLOOD PRESSURE < 130 MM HG: ICD-10-PCS | Mod: CPTII,,, | Performed by: STUDENT IN AN ORGANIZED HEALTH CARE EDUCATION/TRAINING PROGRAM

## 2023-04-05 PROCEDURE — 1159F PR MEDICATION LIST DOCUMENTED IN MEDICAL RECORD: ICD-10-PCS | Mod: CPTII,,, | Performed by: STUDENT IN AN ORGANIZED HEALTH CARE EDUCATION/TRAINING PROGRAM

## 2023-04-05 PROCEDURE — 1160F PR REVIEW ALL MEDS BY PRESCRIBER/CLIN PHARMACIST DOCUMENTED: ICD-10-PCS | Mod: CPTII,,, | Performed by: STUDENT IN AN ORGANIZED HEALTH CARE EDUCATION/TRAINING PROGRAM

## 2023-04-05 RX ORDER — FAMOTIDINE 20 MG/1
20 TABLET, FILM COATED ORAL 2 TIMES DAILY
Qty: 28 TABLET | Refills: 0 | Status: SHIPPED | OUTPATIENT
Start: 2023-04-05 | End: 2024-01-03

## 2023-04-05 RX ORDER — BENZONATATE 200 MG/1
200 CAPSULE ORAL 3 TIMES DAILY PRN
Qty: 30 CAPSULE | Refills: 0 | Status: SHIPPED | OUTPATIENT
Start: 2023-04-05 | End: 2023-04-15

## 2023-04-05 RX ORDER — TERBINAFINE HYDROCHLORIDE 250 MG/1
250 TABLET ORAL
COMMUNITY
Start: 2023-03-16 | End: 2024-01-03

## 2023-04-05 RX ORDER — PROMETHAZINE HYDROCHLORIDE AND DEXTROMETHORPHAN HYDROBROMIDE 6.25; 15 MG/5ML; MG/5ML
5 SYRUP ORAL EVERY 4 HOURS PRN
Qty: 200 ML | Refills: 0 | Status: SHIPPED | OUTPATIENT
Start: 2023-04-05 | End: 2023-04-15

## 2023-04-05 NOTE — PROGRESS NOTES
[facility] Note    HPI:  Bianca Richard is a 32 y.o. female who presents today for Cough (Patient states she was up all night coughing and has a headache.  she states she took an at home covid test and it was negative.  She states no fever no sore throat.)    Associated with coughing is intermittent ear popping.   Patient took Nyquil with minimal resolution. Was up all night long coughing with no respite.   Denies recent sick contacts, history of severe allergies, appetite changes, fever, chills, dizziness, vision changes, chest pain, palpitations, shortness of breath, nausea, vomiting, diarrhea, constipation.     ROS   A review of systems was performed and was negative except as noted above.    I personally reviewed allergies, past medical, surgical, social and family history and updated as appropriate.    Medications:    Current Outpatient Medications:     terbinafine HCL (LAMISIL) 250 mg tablet, Take 250 mg by mouth., Disp: , Rfl:     benzonatate (TESSALON) 200 MG capsule, Take 1 capsule (200 mg total) by mouth 3 (three) times daily as needed for Cough., Disp: 30 capsule, Rfl: 0    famotidine (PEPCID) 20 MG tablet, Take 1 tablet (20 mg total) by mouth 2 (two) times daily. for 14 days, Disp: 28 tablet, Rfl: 0    promethazine-dextromethorphan (PROMETHAZINE-DM) 6.25-15 mg/5 mL Syrp, Take 5 mLs by mouth every 4 (four) hours as needed (cough and congestion)., Disp: 200 mL, Rfl: 0     Health Maintenance:  Immunization History   Administered Date(s) Administered    DTaP 01/28/1991, 04/22/1991, 09/09/1991, 05/03/1993, 08/04/1995    HIB 04/22/1991, 09/09/1991, 05/03/1993    Hepatitis B, Pediatric/Adolescent 09/22/2004, 11/03/2004, 04/01/2005    IPV 01/28/1991, 04/22/1991, 05/03/1993, 08/04/1995    MMR 05/03/1993, 08/04/1995    Td - PF (ADULT) 09/22/2004    Tdap 01/11/2018    Varicella 09/22/2004      Health Maintenance   Topic Date Due    TETANUS VACCINE  01/11/2028    Hepatitis C Screening  Completed     "Lipid Panel  Completed     Health Maintenance Topics with due status: Not Due       Topic Last Completion Date    TETANUS VACCINE 01/11/2018    Cervical Cancer Screening 02/23/2022     Health Maintenance Due   Topic Date Due    Pneumococcal Vaccines (Age 0-64) (1 - PCV) Never done       PHYSICAL EXAM:  Vitals:    04/05/23 1318   BP: (!) 101/58   BP Location: Right arm   Patient Position: Sitting   BP Method: Large (Automatic)   Pulse: 75   Resp: 18   Temp: 98.2 °F (36.8 °C)   TempSrc: Oral   SpO2: 99%   Weight: 88.5 kg (195 lb)   Height: 5' 9" (1.753 m)     Body mass index is 28.8 kg/m².  Physical Exam  Vitals reviewed.   Constitutional:       General: She is not in acute distress.     Appearance: Normal appearance. She is normal weight. She is not ill-appearing, toxic-appearing or diaphoretic.   HENT:      Head: Normocephalic and atraumatic.      Right Ear: External ear normal.      Left Ear: External ear normal.      Nose: Congestion present. No rhinorrhea.      Mouth/Throat:      Mouth: Mucous membranes are dry.      Pharynx: Oropharynx is clear.   Eyes:      Extraocular Movements: Extraocular movements intact.      Conjunctiva/sclera: Conjunctivae normal.   Cardiovascular:      Rate and Rhythm: Normal rate and regular rhythm.      Pulses: Normal pulses.      Heart sounds: Normal heart sounds. No murmur heard.  Pulmonary:      Effort: Pulmonary effort is normal. No respiratory distress.      Breath sounds: No stridor. No wheezing, rhonchi or rales.   Abdominal:      General: Abdomen is flat. There is no distension.      Palpations: Abdomen is soft. There is no mass.      Tenderness: There is no abdominal tenderness. There is no right CVA tenderness, left CVA tenderness or guarding.   Musculoskeletal:         General: No swelling, tenderness or deformity. Normal range of motion.      Cervical back: Normal range of motion and neck supple. No rigidity or tenderness.      Right lower leg: No edema.      Left lower " leg: No edema.   Lymphadenopathy:      Cervical: No cervical adenopathy.   Skin:     General: Skin is warm and dry.      Capillary Refill: Capillary refill takes less than 2 seconds.   Neurological:      General: No focal deficit present.      Mental Status: She is alert and oriented to person, place, and time. Mental status is at baseline.      Motor: No weakness.      Gait: Gait normal.   Psychiatric:         Mood and Affect: Mood normal.         Behavior: Behavior normal.         Thought Content: Thought content normal.         Judgment: Judgment normal.      ASSESSMENT/PLAN:  1. Upper respiratory tract infection, unspecified type  Assessment & Plan:  One day since symptom onset. Counseled maintaining adequate hydration. Symptom management with cough suppressants.   - f/u one week or sooner with worsening     Orders:  -     benzonatate (TESSALON) 200 MG capsule; Take 1 capsule (200 mg total) by mouth 3 (three) times daily as needed for Cough.  Dispense: 30 capsule; Refill: 0  -     famotidine (PEPCID) 20 MG tablet; Take 1 tablet (20 mg total) by mouth 2 (two) times daily. for 14 days  Dispense: 28 tablet; Refill: 0  -     promethazine-dextromethorphan (PROMETHAZINE-DM) 6.25-15 mg/5 mL Syrp; Take 5 mLs by mouth every 4 (four) hours as needed (cough and congestion).  Dispense: 200 mL; Refill: 0    2. BMI 28.0-28.9,adult  Assessment & Plan:  Wt Readings from Last 3 Encounters:   04/05/23 1318 88.5 kg (195 lb)   03/08/23 1455 86.2 kg (190 lb)   10/18/22 1403 86.2 kg (190 lb)   Recommendations:   Stay physically active. As tolerated alternate resistance training with stretching and cardio. Goal of 150 minutes per week of moderate intensity activity or 7,500 - 10,000 steps per day. Follow the Mediterranean Diet. Include whole fresh fruits, vegetables, olive oil, seeds, nuts, whole grains, cold water fish, salmon, mackerel and lean cuts of meat.  Do not drink sugary/diet carbonated beverages. Decrease portion sizes  slightly which will result in an approximately 500-calorie deficit. Avoid fast or fried and processed food, especially canned foods. Avoid refined carbohydrates, white starchy foods, flour, white potato, bread, muffins, and cakes. Consider substituting one meal a day with a meal replacement such as Slim fast, lean cuisine, or weight watcher's. Follow a healthy diet that includes enough calcium, vitamin D and proteins for bone health.            Other than changes above, continue current medications and maintain follow up with specialists.      Follow up in about 1 week (around 4/12/2023), or if symptoms worsen or fail to improve.   Recent Results (from the past 2016 hour(s))   Group A Strep, Molecular    Collection Time: 03/08/23  2:58 PM    Specimen: Throat   Result Value Ref Range    Group A Strep, Molecular Negative Negative         Reinaldo Camara DO  [facility]

## 2023-04-05 NOTE — ASSESSMENT & PLAN NOTE
Wt Readings from Last 3 Encounters:   04/05/23 1318 88.5 kg (195 lb)   03/08/23 1455 86.2 kg (190 lb)   10/18/22 1403 86.2 kg (190 lb)   Recommendations:   Stay physically active. As tolerated alternate resistance training with stretching and cardio. Goal of 150 minutes per week of moderate intensity activity or 7,500 - 10,000 steps per day. Follow the Mediterranean Diet. Include whole fresh fruits, vegetables, olive oil, seeds, nuts, whole grains, cold water fish, salmon, mackerel and lean cuts of meat.  Do not drink sugary/diet carbonated beverages. Decrease portion sizes slightly which will result in an approximately 500-calorie deficit. Avoid fast or fried and processed food, especially canned foods. Avoid refined carbohydrates, white starchy foods, flour, white potato, bread, muffins, and cakes. Consider substituting one meal a day with a meal replacement such as Slim fast, lean cuisine, or weight watcher's. Follow a healthy diet that includes enough calcium, vitamin D and proteins for bone health.

## 2023-04-05 NOTE — ASSESSMENT & PLAN NOTE
One day since symptom onset. Counseled maintaining adequate hydration. Symptom management with cough suppressants.   - f/u one week or sooner with worsening

## 2023-10-09 ENCOUNTER — PATIENT OUTREACH (OUTPATIENT)
Dept: ADMINISTRATIVE | Facility: HOSPITAL | Age: 33
End: 2023-10-09
Payer: MEDICAID

## 2024-01-03 ENCOUNTER — OFFICE VISIT (OUTPATIENT)
Dept: PRIMARY CARE CLINIC | Facility: CLINIC | Age: 34
End: 2024-01-03
Payer: MEDICAID

## 2024-01-03 VITALS
WEIGHT: 188 LBS | HEIGHT: 69 IN | RESPIRATION RATE: 16 BRPM | SYSTOLIC BLOOD PRESSURE: 120 MMHG | HEART RATE: 71 BPM | BODY MASS INDEX: 27.85 KG/M2 | TEMPERATURE: 98 F | OXYGEN SATURATION: 99 % | DIASTOLIC BLOOD PRESSURE: 73 MMHG

## 2024-01-03 DIAGNOSIS — F32.A FATIGUE DUE TO DEPRESSION: ICD-10-CM

## 2024-01-03 DIAGNOSIS — R53.83 FATIGUE DUE TO DEPRESSION: ICD-10-CM

## 2024-01-03 DIAGNOSIS — F41.9 SEVERE ANXIETY: ICD-10-CM

## 2024-01-03 DIAGNOSIS — D64.9 ANEMIA, UNSPECIFIED TYPE: ICD-10-CM

## 2024-01-03 DIAGNOSIS — S39.012A LOW BACK STRAIN, INITIAL ENCOUNTER: Primary | ICD-10-CM

## 2024-01-03 PROCEDURE — 3008F BODY MASS INDEX DOCD: CPT | Mod: CPTII,,, | Performed by: STUDENT IN AN ORGANIZED HEALTH CARE EDUCATION/TRAINING PROGRAM

## 2024-01-03 PROCEDURE — 3078F DIAST BP <80 MM HG: CPT | Mod: CPTII,,, | Performed by: STUDENT IN AN ORGANIZED HEALTH CARE EDUCATION/TRAINING PROGRAM

## 2024-01-03 PROCEDURE — 99213 OFFICE O/P EST LOW 20 MIN: CPT | Mod: PBBFAC | Performed by: STUDENT IN AN ORGANIZED HEALTH CARE EDUCATION/TRAINING PROGRAM

## 2024-01-03 PROCEDURE — 3074F SYST BP LT 130 MM HG: CPT | Mod: CPTII,,, | Performed by: STUDENT IN AN ORGANIZED HEALTH CARE EDUCATION/TRAINING PROGRAM

## 2024-01-03 PROCEDURE — 1159F MED LIST DOCD IN RCRD: CPT | Mod: CPTII,,, | Performed by: STUDENT IN AN ORGANIZED HEALTH CARE EDUCATION/TRAINING PROGRAM

## 2024-01-03 PROCEDURE — 99999 PR PBB SHADOW E&M-EST. PATIENT-LVL III: CPT | Mod: PBBFAC,,, | Performed by: STUDENT IN AN ORGANIZED HEALTH CARE EDUCATION/TRAINING PROGRAM

## 2024-01-03 PROCEDURE — 99214 OFFICE O/P EST MOD 30 MIN: CPT | Mod: S$PBB,,, | Performed by: STUDENT IN AN ORGANIZED HEALTH CARE EDUCATION/TRAINING PROGRAM

## 2024-01-03 RX ORDER — IBUPROFEN 600 MG/1
600 TABLET ORAL 3 TIMES DAILY
Qty: 42 TABLET | Refills: 1 | Status: SHIPPED | OUTPATIENT
Start: 2024-01-03 | End: 2024-01-31

## 2024-01-03 RX ORDER — ALPRAZOLAM 0.25 MG/1
0.25 TABLET ORAL 3 TIMES DAILY PRN
Qty: 15 TABLET | Refills: 0 | Status: SHIPPED | OUTPATIENT
Start: 2024-01-03 | End: 2024-01-23 | Stop reason: SDUPTHER

## 2024-01-03 RX ORDER — METHOCARBAMOL 500 MG/1
500 TABLET, FILM COATED ORAL 4 TIMES DAILY
Qty: 40 TABLET | Refills: 0 | Status: SHIPPED | OUTPATIENT
Start: 2024-01-03 | End: 2024-01-31 | Stop reason: ALTCHOICE

## 2024-01-03 RX ORDER — DEXTROMETHORPHAN HYDROBROMIDE, GUAIFENESIN 5; 100 MG/5ML; MG/5ML
650 LIQUID ORAL EVERY 8 HOURS
Qty: 42 TABLET | Refills: 1 | Status: SHIPPED | OUTPATIENT
Start: 2024-01-03 | End: 2024-01-31

## 2024-01-03 NOTE — PROGRESS NOTES
[facility] Note    HPI:  Bianca Richard is a 33 y.o. female who presents today for Back Pain (Back pain and tingling in right leg for 2 weeks)       ROS   A review of systems was performed and was negative except as noted above.    I personally reviewed allergies, past medical, surgical, social and family history and updated as appropriate.    Medications:    Current Outpatient Medications:     acetaminophen (TYLENOL) 650 MG TbSR, Take 1 tablet (650 mg total) by mouth every 8 (eight) hours., Disp: 42 tablet, Rfl: 1    ALPRAZolam (XANAX) 0.25 MG tablet, Take 1 tablet (0.25 mg total) by mouth 3 (three) times daily as needed for Anxiety., Disp: 15 tablet, Rfl: 0    ibuprofen (ADVIL,MOTRIN) 600 MG tablet, Take 1 tablet (600 mg total) by mouth 3 (three) times daily., Disp: 42 tablet, Rfl: 1    methocarbamoL (ROBAXIN) 500 MG Tab, Take 1 tablet (500 mg total) by mouth 4 (four) times daily. for 10 days, Disp: 40 tablet, Rfl: 0     Health Maintenance:  Immunization History   Administered Date(s) Administered    DTaP 01/28/1991, 04/22/1991, 09/09/1991, 05/03/1993, 08/04/1995    HIB 04/22/1991, 09/09/1991, 05/03/1993    Hepatitis B, Pediatric/Adolescent 09/22/2004, 11/03/2004, 04/01/2005    IPV 01/28/1991, 04/22/1991, 05/03/1993, 08/04/1995    MMR 05/03/1993, 08/04/1995    Td (ADULT) 09/22/2004    Td - PF (ADULT) 09/22/2004    Tdap 01/11/2018    Varicella 09/22/2004      Health Maintenance   Topic Date Due    TETANUS VACCINE  01/11/2028    Hepatitis C Screening  Completed    Lipid Panel  Completed     Health Maintenance Topics with due status: Not Due       Topic Last Completion Date    TETANUS VACCINE 01/11/2018    Cervical Cancer Screening 02/23/2022     Health Maintenance Due   Topic Date Due    COVID-19 Vaccine (1) Never done    Pneumococcal Vaccines (Age 0-64) (1 - PCV) Never done    Influenza Vaccine (1) Never done       PHYSICAL EXAM:  Vitals:    01/03/24 1428   BP: 120/73   BP Location: Left arm  "  Patient Position: Sitting   BP Method: Medium (Automatic)   Pulse: 71   Resp: 16   Temp: 97.9 °F (36.6 °C)   SpO2: 99%   Weight: 85.3 kg (188 lb)   Height: 5' 9" (1.753 m)     Body mass index is 27.76 kg/m².  Physical Exam  Vitals reviewed.   Constitutional:       General: She is not in acute distress.     Appearance: Normal appearance. She is normal weight. She is not ill-appearing, toxic-appearing or diaphoretic.   HENT:      Head: Normocephalic and atraumatic.      Right Ear: External ear normal.      Left Ear: External ear normal.      Nose: No congestion or rhinorrhea.      Mouth/Throat:      Mouth: Mucous membranes are dry.      Pharynx: Oropharynx is clear.   Eyes:      Extraocular Movements: Extraocular movements intact.      Conjunctiva/sclera: Conjunctivae normal.   Cardiovascular:      Rate and Rhythm: Normal rate and regular rhythm.      Pulses: Normal pulses.      Heart sounds: Normal heart sounds. No murmur heard.  Pulmonary:      Effort: Pulmonary effort is normal. No respiratory distress.      Breath sounds: No stridor. No wheezing, rhonchi or rales.   Abdominal:      General: Abdomen is flat. There is no distension.      Palpations: Abdomen is soft. There is no mass.      Tenderness: There is no abdominal tenderness. There is no right CVA tenderness, left CVA tenderness or guarding.   Musculoskeletal:         General: No swelling, tenderness or deformity. Normal range of motion.      Cervical back: Normal range of motion and neck supple. No rigidity or tenderness.      Right lower leg: No edema.      Left lower leg: No edema.   Lymphadenopathy:      Cervical: No cervical adenopathy.   Skin:     General: Skin is warm and dry.      Capillary Refill: Capillary refill takes less than 2 seconds.   Neurological:      General: No focal deficit present.      Mental Status: She is alert and oriented to person, place, and time. Mental status is at baseline.      Motor: No weakness.      Gait: Gait normal. "   Psychiatric:         Mood and Affect: Mood normal.         Behavior: Behavior normal.         Thought Content: Thought content normal.         Judgment: Judgment normal.          ASSESSMENT/PLAN:  1. Low back strain, initial encounter  -     methocarbamoL (ROBAXIN) 500 MG Tab; Take 1 tablet (500 mg total) by mouth 4 (four) times daily. for 10 days  Dispense: 40 tablet; Refill: 0  -     acetaminophen (TYLENOL) 650 MG TbSR; Take 1 tablet (650 mg total) by mouth every 8 (eight) hours.  Dispense: 42 tablet; Refill: 1  -     ibuprofen (ADVIL,MOTRIN) 600 MG tablet; Take 1 tablet (600 mg total) by mouth 3 (three) times daily.  Dispense: 42 tablet; Refill: 1    2. Anemia, unspecified type  Assessment & Plan:  Low normal levels of iron and vitamin B12.   Counseled patient on food groups to incorporate in daily diet. Consider supplementation with Blood Builders from [employer] containing folic acid, vitamin B12 and iron. Over denies symptoms. Patient still has oral ulcers that concern her. Will follow up with STI screen.       3. Fatigue due to depression  Assessment & Plan:  Associated with primary care taker to father with terminal condition with psychiatric disorder.   Counseled at length on importance of taking care of her self before her father who is receiving home hospice care.   Endorses has support at home.   - practice relaxation techniques by finding quiet moment to herself      4. Severe anxiety  -     ALPRAZolam (XANAX) 0.25 MG tablet; Take 1 tablet (0.25 mg total) by mouth 3 (three) times daily as needed for Anxiety.  Dispense: 15 tablet; Refill: 0    5. BMI 28.0-28.9,adult  Assessment & Plan:  Wt Readings from Last 3 Encounters:   01/03/24 1428 85.3 kg (188 lb)   04/05/23 1318 88.5 kg (195 lb)   03/08/23 1455 86.2 kg (190 lb)   Recommendations:   Stay physically active. As tolerated alternate resistance training with stretching and cardio. Goal of 150 minutes per week of moderate intensity activity or 7,500 -  10,000 steps per day. Follow the Mediterranean Diet. Include whole fresh fruits, vegetables, olive oil, seeds, nuts, whole grains, cold water fish, salmon, mackerel and lean cuts of meat.  Do not drink sugary/diet carbonated beverages. Decrease portion sizes slightly which will result in an approximately 500-calorie deficit. Avoid fast or fried and processed food, especially canned foods. Avoid refined carbohydrates, white starchy foods, flour, white potato, bread, muffins, and cakes. Consider substituting one meal a day with a meal replacement such as Slim fast, lean cuisine, or weight watcher's. Follow a healthy diet that includes enough calcium, vitamin D and proteins for bone health.          Other than changes above, continue current medications and maintain follow up with specialists.      No follow-ups on file.   No results found for this or any previous visit (from the past 2016 hour(s)).      Kazumi G Yoshinaga, DO [facility]

## 2024-01-03 NOTE — ASSESSMENT & PLAN NOTE
Wt Readings from Last 3 Encounters:   01/03/24 1428 85.3 kg (188 lb)   04/05/23 1318 88.5 kg (195 lb)   03/08/23 1455 86.2 kg (190 lb)   Recommendations:   Stay physically active. As tolerated alternate resistance training with stretching and cardio. Goal of 150 minutes per week of moderate intensity activity or 7,500 - 10,000 steps per day. Follow the Mediterranean Diet. Include whole fresh fruits, vegetables, olive oil, seeds, nuts, whole grains, cold water fish, salmon, mackerel and lean cuts of meat.  Do not drink sugary/diet carbonated beverages. Decrease portion sizes slightly which will result in an approximately 500-calorie deficit. Avoid fast or fried and processed food, especially canned foods. Avoid refined carbohydrates, white starchy foods, flour, white potato, bread, muffins, and cakes. Consider substituting one meal a day with a meal replacement such as Slim fast, lean cuisine, or weight watcher's. Follow a healthy diet that includes enough calcium, vitamin D and proteins for bone health.

## 2024-01-04 PROBLEM — F41.9 SEVERE ANXIETY: Status: ACTIVE | Noted: 2024-01-04

## 2024-01-04 PROBLEM — J06.9 UPPER RESPIRATORY TRACT INFECTION: Status: RESOLVED | Noted: 2023-04-05 | Resolved: 2024-01-04

## 2024-01-04 PROBLEM — F32.A FATIGUE DUE TO DEPRESSION: Status: ACTIVE | Noted: 2024-01-04

## 2024-01-04 PROBLEM — R53.83 FATIGUE DUE TO DEPRESSION: Status: ACTIVE | Noted: 2024-01-04

## 2024-01-04 PROBLEM — S39.012A LOW BACK STRAIN, INITIAL ENCOUNTER: Status: ACTIVE | Noted: 2024-01-04

## 2024-01-23 DIAGNOSIS — F41.9 SEVERE ANXIETY: ICD-10-CM

## 2024-01-23 DIAGNOSIS — F41.9 SEVERE ANXIETY: Primary | ICD-10-CM

## 2024-01-23 RX ORDER — ALPRAZOLAM 0.25 MG/1
0.25 TABLET ORAL 3 TIMES DAILY PRN
Qty: 15 TABLET | Refills: 0 | Status: SHIPPED | OUTPATIENT
Start: 2024-01-23 | End: 2024-01-31 | Stop reason: SDUPTHER

## 2024-01-23 RX ORDER — ALPRAZOLAM 0.25 MG/1
0.25 TABLET ORAL 3 TIMES DAILY PRN
Qty: 15 TABLET | Refills: 0 | Status: CANCELLED | OUTPATIENT
Start: 2024-01-23 | End: 2024-01-28

## 2024-01-31 ENCOUNTER — OFFICE VISIT (OUTPATIENT)
Dept: PRIMARY CARE CLINIC | Facility: CLINIC | Age: 34
End: 2024-01-31
Payer: MEDICAID

## 2024-01-31 VITALS
HEIGHT: 69 IN | TEMPERATURE: 98 F | WEIGHT: 185 LBS | HEART RATE: 80 BPM | SYSTOLIC BLOOD PRESSURE: 117 MMHG | DIASTOLIC BLOOD PRESSURE: 89 MMHG | RESPIRATION RATE: 16 BRPM | OXYGEN SATURATION: 98 % | BODY MASS INDEX: 27.4 KG/M2

## 2024-01-31 DIAGNOSIS — F41.9 SEVERE ANXIETY: ICD-10-CM

## 2024-01-31 DIAGNOSIS — R53.83 FATIGUE DUE TO DEPRESSION: Primary | ICD-10-CM

## 2024-01-31 DIAGNOSIS — F32.A FATIGUE DUE TO DEPRESSION: Primary | ICD-10-CM

## 2024-01-31 PROCEDURE — 3008F BODY MASS INDEX DOCD: CPT | Mod: CPTII,,, | Performed by: STUDENT IN AN ORGANIZED HEALTH CARE EDUCATION/TRAINING PROGRAM

## 2024-01-31 PROCEDURE — 99213 OFFICE O/P EST LOW 20 MIN: CPT | Mod: PBBFAC | Performed by: STUDENT IN AN ORGANIZED HEALTH CARE EDUCATION/TRAINING PROGRAM

## 2024-01-31 PROCEDURE — 1159F MED LIST DOCD IN RCRD: CPT | Mod: CPTII,,, | Performed by: STUDENT IN AN ORGANIZED HEALTH CARE EDUCATION/TRAINING PROGRAM

## 2024-01-31 PROCEDURE — 99999 PR PBB SHADOW E&M-EST. PATIENT-LVL III: CPT | Mod: PBBFAC,,, | Performed by: STUDENT IN AN ORGANIZED HEALTH CARE EDUCATION/TRAINING PROGRAM

## 2024-01-31 PROCEDURE — 3079F DIAST BP 80-89 MM HG: CPT | Mod: CPTII,,, | Performed by: STUDENT IN AN ORGANIZED HEALTH CARE EDUCATION/TRAINING PROGRAM

## 2024-01-31 PROCEDURE — 3074F SYST BP LT 130 MM HG: CPT | Mod: CPTII,,, | Performed by: STUDENT IN AN ORGANIZED HEALTH CARE EDUCATION/TRAINING PROGRAM

## 2024-01-31 PROCEDURE — 99213 OFFICE O/P EST LOW 20 MIN: CPT | Mod: S$PBB,,, | Performed by: STUDENT IN AN ORGANIZED HEALTH CARE EDUCATION/TRAINING PROGRAM

## 2024-01-31 RX ORDER — ESCITALOPRAM OXALATE 10 MG/1
10 TABLET ORAL DAILY
Qty: 30 TABLET | Refills: 11 | Status: SHIPPED | OUTPATIENT
Start: 2024-01-31 | End: 2025-01-30

## 2024-01-31 RX ORDER — ALPRAZOLAM 0.25 MG/1
0.25 TABLET ORAL 3 TIMES DAILY PRN
Qty: 15 TABLET | Refills: 0 | Status: SHIPPED | OUTPATIENT
Start: 2024-01-31 | End: 2024-06-07

## 2024-01-31 NOTE — PROGRESS NOTES
[facility] Note    HPI:  Bianca Richard is a 33 y.o. female who presents today for Follow-up (Follow up low back strain) and Anxiety (Anxiety follow up)  Patient's father recently  after battling from lung cancer. 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   Has returned to work yesterday and still findings events following her father's death overwhelming.   Often tearing up, but staying strong for her children.   Patient also has become primary caretaker to her mother who recently fell and fractured her hip.   Endorses has good family support.   Denies SI/HI harm to self or others, no increased agitation or  irritability.   Xanax right now intermittently has helped her when the anxiety becomes severe to the point of having to catch her breath.    ROS   A review of systems was performed and was negative except as noted above.    I personally reviewed allergies, past medical, surgical, social and family history and updated as appropriate.    Medications:    Current Outpatient Medications:     ALPRAZolam (XANAX) 0.25 MG tablet, Take 1 tablet (0.25 mg total) by mouth 3 (three) times daily as needed for Anxiety., Disp: 15 tablet, Rfl: 0    EScitalopram oxalate (LEXAPRO) 10 MG tablet, Take 1 tablet (10 mg total) by mouth once daily., Disp: 30 tablet, Rfl: 11     Health Maintenance:  Immunization History   Administered Date(s) Administered    DTaP 1991, 1991, 1991, 1993, 1995    HIB 1991, 1991, 1993    Hepatitis B, Pediatric/Adolescent 2004, 2004, 2005    IPV 1991, 1991, 1993, 1995    MMR 1993, 1995    Td (ADULT) 2004    Td - PF (ADULT) 2004    Tdap 2018    Varicella 2004      Health Maintenance   Topic Date Due    TETANUS VACCINE  2028    Hepatitis C Screening  Completed    Lipid Panel  Completed     Health Maintenance Topics with due status: Not Due       Topic Last  "Completion Date    TETANUS VACCINE 01/11/2018    Cervical Cancer Screening 02/23/2022     Health Maintenance Due   Topic Date Due    COVID-19 Vaccine (1) Never done    Pneumococcal Vaccines (Age 0-64) (1 of 2 - PCV) Never done    Influenza Vaccine (1) Never done       PHYSICAL EXAM:  Vitals:    01/31/24 0920   BP: 117/89   BP Location: Right arm   Patient Position: Sitting   BP Method: Large (Automatic)   Pulse: 80   Resp: 16   Temp: 98.4 °F (36.9 °C)   SpO2: 98%   Weight: 83.9 kg (185 lb)   Height: 5' 9" (1.753 m)     Body mass index is 27.32 kg/m².  Physical Exam  Vitals and nursing note reviewed.   Constitutional:       General: She is not in acute distress.     Appearance: Normal appearance. She is normal weight. She is not ill-appearing, toxic-appearing or diaphoretic.   HENT:      Head: Normocephalic and atraumatic.      Right Ear: External ear normal.      Left Ear: External ear normal.      Nose: No congestion or rhinorrhea.      Mouth/Throat:      Mouth: Mucous membranes are dry.      Pharynx: Oropharynx is clear.   Eyes:      Extraocular Movements: Extraocular movements intact.      Conjunctiva/sclera: Conjunctivae normal.   Cardiovascular:      Rate and Rhythm: Normal rate and regular rhythm.      Pulses: Normal pulses.      Heart sounds: Normal heart sounds. No murmur heard.  Pulmonary:      Effort: Pulmonary effort is normal. No respiratory distress.      Breath sounds: No stridor. No wheezing, rhonchi or rales.   Abdominal:      General: Abdomen is flat. There is no distension.      Palpations: Abdomen is soft. There is no mass.      Tenderness: There is no abdominal tenderness. There is no right CVA tenderness, left CVA tenderness or guarding.   Musculoskeletal:         General: No swelling, tenderness or deformity. Normal range of motion.      Cervical back: Normal range of motion and neck supple. No rigidity or tenderness.      Right lower leg: No edema.      Left lower leg: No edema. "   Lymphadenopathy:      Cervical: No cervical adenopathy.   Skin:     General: Skin is warm and dry.      Capillary Refill: Capillary refill takes less than 2 seconds.   Neurological:      General: No focal deficit present.      Mental Status: She is alert and oriented to person, place, and time. Mental status is at baseline.      Motor: No weakness.      Gait: Gait normal.   Psychiatric:         Mood and Affect: Mood normal.         Behavior: Behavior normal.         Thought Content: Thought content normal.         Judgment: Judgment normal.        ASSESSMENT/PLAN:  1. Fatigue due to depression  Assessment & Plan:  Now after death of her father, patient is needing to help assist her mother who recently fell and fractured her hip. Counseled at length on importance of taking care of her self before her father who is receiving home hospice care.   Endorses has support at home.   - practice relaxation techniques by finding quiet moment to herself      2. Severe anxiety  -     ALPRAZolam (XANAX) 0.25 MG tablet; Take 1 tablet (0.25 mg total) by mouth 3 (three) times daily as needed for Anxiety.  Dispense: 15 tablet; Refill: 0  -     EScitalopram oxalate (LEXAPRO) 10 MG tablet; Take 1 tablet (10 mg total) by mouth once daily.  Dispense: 30 tablet; Refill: 11    3. BMI 28.0-28.9,adult  Assessment & Plan:  Wt Readings from Last 3 Encounters:   01/31/24 0920 83.9 kg (185 lb)   01/03/24 1428 85.3 kg (188 lb)   04/05/23 1318 88.5 kg (195 lb)   Recommendations:   Stay physically active. As tolerated alternate resistance training with stretching and cardio. Goal of 150 minutes per week of moderate intensity activity or 7,500 - 10,000 steps per day. Follow the Mediterranean Diet. Include whole fresh fruits, vegetables, olive oil, seeds, nuts, whole grains, cold water fish, salmon, mackerel and lean cuts of meat.  Do not drink sugary/diet carbonated beverages. Decrease portion sizes slightly which will result in an approximately  500-calorie deficit. Avoid fast or fried and processed food, especially canned foods. Avoid refined carbohydrates, white starchy foods, flour, white potato, bread, muffins, and cakes. Consider substituting one meal a day with a meal replacement such as Slim fast, lean cuisine, or weight watcher's. Follow a healthy diet that includes enough calcium, vitamin D and proteins for bone health.            Other than changes above, continue current medications and maintain follow up with specialists.      No follow-ups on file.   No results found for this or any previous visit (from the past 2016 hour(s)).      Kazumi G Yoshinaga, DO [facility]

## 2024-02-01 NOTE — ASSESSMENT & PLAN NOTE
Wt Readings from Last 3 Encounters:   01/31/24 0920 83.9 kg (185 lb)   01/03/24 1428 85.3 kg (188 lb)   04/05/23 1318 88.5 kg (195 lb)   Recommendations:   Stay physically active. As tolerated alternate resistance training with stretching and cardio. Goal of 150 minutes per week of moderate intensity activity or 7,500 - 10,000 steps per day. Follow the Mediterranean Diet. Include whole fresh fruits, vegetables, olive oil, seeds, nuts, whole grains, cold water fish, salmon, mackerel and lean cuts of meat.  Do not drink sugary/diet carbonated beverages. Decrease portion sizes slightly which will result in an approximately 500-calorie deficit. Avoid fast or fried and processed food, especially canned foods. Avoid refined carbohydrates, white starchy foods, flour, white potato, bread, muffins, and cakes. Consider substituting one meal a day with a meal replacement such as Slim fast, lean cuisine, or weight watcher's. Follow a healthy diet that includes enough calcium, vitamin D and proteins for bone health.

## 2024-02-01 NOTE — ASSESSMENT & PLAN NOTE
Now after death of her father, patient is needing to help assist her mother who recently fell and fractured her hip. Counseled at length on importance of taking care of her self before her father who is receiving home hospice care.   Endorses has support at home.   - practice relaxation techniques by finding quiet moment to herself

## 2024-06-07 ENCOUNTER — OFFICE VISIT (OUTPATIENT)
Dept: PRIMARY CARE CLINIC | Facility: CLINIC | Age: 34
End: 2024-06-07
Payer: MEDICAID

## 2024-06-07 VITALS
WEIGHT: 200 LBS | HEART RATE: 73 BPM | RESPIRATION RATE: 16 BRPM | BODY MASS INDEX: 29.62 KG/M2 | DIASTOLIC BLOOD PRESSURE: 55 MMHG | OXYGEN SATURATION: 98 % | HEIGHT: 69 IN | SYSTOLIC BLOOD PRESSURE: 110 MMHG

## 2024-06-07 DIAGNOSIS — Z90.49 S/P CHOLECYSTECTOMY: ICD-10-CM

## 2024-06-07 DIAGNOSIS — F32.A FATIGUE DUE TO DEPRESSION: ICD-10-CM

## 2024-06-07 DIAGNOSIS — R19.7 DIARRHEA, UNSPECIFIED TYPE: Primary | ICD-10-CM

## 2024-06-07 DIAGNOSIS — R53.83 FATIGUE DUE TO DEPRESSION: ICD-10-CM

## 2024-06-07 DIAGNOSIS — F41.9 SEVERE ANXIETY: ICD-10-CM

## 2024-06-07 DIAGNOSIS — E78.2 MIXED HYPERLIPIDEMIA: ICD-10-CM

## 2024-06-07 LAB
ALBUMIN SERPL BCP-MCNC: 3.5 G/DL (ref 3.5–5.2)
ALP SERPL-CCNC: 60 U/L (ref 55–135)
ALT SERPL W/O P-5'-P-CCNC: 22 U/L (ref 10–44)
ANION GAP SERPL CALC-SCNC: 5 MMOL/L (ref 3–11)
AST SERPL-CCNC: 20 U/L (ref 10–40)
BASOPHILS # BLD AUTO: 0.02 K/UL (ref 0–0.2)
BASOPHILS NFR BLD: 0.4 % (ref 0–1.9)
BILIRUB SERPL-MCNC: 0.3 MG/DL (ref 0.1–1)
BUN SERPL-MCNC: 11 MG/DL (ref 6–20)
CALCIUM SERPL-MCNC: 8.7 MG/DL (ref 8.7–10.5)
CHLORIDE SERPL-SCNC: 107 MMOL/L (ref 95–110)
CO2 SERPL-SCNC: 28 MMOL/L (ref 23–29)
CREAT SERPL-MCNC: 0.6 MG/DL (ref 0.5–1.4)
DIFFERENTIAL METHOD BLD: ABNORMAL
EOSINOPHIL # BLD AUTO: 0.1 K/UL (ref 0–0.5)
EOSINOPHIL NFR BLD: 1.4 % (ref 0–8)
ERYTHROCYTE [DISTWIDTH] IN BLOOD BY AUTOMATED COUNT: 12.8 % (ref 11.5–14.5)
EST. GFR  (NO RACE VARIABLE): >60 ML/MIN/1.73 M^2
GLUCOSE SERPL-MCNC: 78 MG/DL (ref 70–110)
HCT VFR BLD AUTO: 34.8 % (ref 37–48.5)
HGB BLD-MCNC: 11.7 G/DL (ref 12–16)
IMM GRANULOCYTES # BLD AUTO: 0.02 K/UL (ref 0–0.04)
IMM GRANULOCYTES NFR BLD AUTO: 0.4 % (ref 0–0.5)
LYMPHOCYTES # BLD AUTO: 1.8 K/UL (ref 1–4.8)
LYMPHOCYTES NFR BLD: 31.4 % (ref 18–48)
MCH RBC QN AUTO: 30.9 PG (ref 27–31)
MCHC RBC AUTO-ENTMCNC: 33.6 G/DL (ref 32–36)
MCV RBC AUTO: 92 FL (ref 82–98)
MONOCYTES # BLD AUTO: 0.5 K/UL (ref 0.3–1)
MONOCYTES NFR BLD: 9.2 % (ref 4–15)
NEUTROPHILS # BLD AUTO: 3.2 K/UL (ref 1.8–7.7)
NEUTROPHILS NFR BLD: 57.2 % (ref 38–73)
NRBC BLD-RTO: 0 /100 WBC
PLATELET # BLD AUTO: 238 K/UL (ref 150–450)
PMV BLD AUTO: 10.8 FL (ref 9.2–12.9)
POTASSIUM SERPL-SCNC: 4 MMOL/L (ref 3.5–5.1)
PROT SERPL-MCNC: 6.4 G/DL (ref 6–8.4)
RBC # BLD AUTO: 3.79 M/UL (ref 4–5.4)
SODIUM SERPL-SCNC: 140 MMOL/L (ref 136–145)
TSH SERPL DL<=0.005 MIU/L-ACNC: 1.27 UIU/ML (ref 0.4–4)
WBC # BLD AUTO: 5.63 K/UL (ref 3.9–12.7)

## 2024-06-07 PROCEDURE — 84443 ASSAY THYROID STIM HORMONE: CPT | Performed by: STUDENT IN AN ORGANIZED HEALTH CARE EDUCATION/TRAINING PROGRAM

## 2024-06-07 PROCEDURE — 3074F SYST BP LT 130 MM HG: CPT | Mod: CPTII,,, | Performed by: STUDENT IN AN ORGANIZED HEALTH CARE EDUCATION/TRAINING PROGRAM

## 2024-06-07 PROCEDURE — 85025 COMPLETE CBC W/AUTO DIFF WBC: CPT | Performed by: STUDENT IN AN ORGANIZED HEALTH CARE EDUCATION/TRAINING PROGRAM

## 2024-06-07 PROCEDURE — 99214 OFFICE O/P EST MOD 30 MIN: CPT | Mod: S$PBB,,, | Performed by: STUDENT IN AN ORGANIZED HEALTH CARE EDUCATION/TRAINING PROGRAM

## 2024-06-07 PROCEDURE — 1159F MED LIST DOCD IN RCRD: CPT | Mod: CPTII,,, | Performed by: STUDENT IN AN ORGANIZED HEALTH CARE EDUCATION/TRAINING PROGRAM

## 2024-06-07 PROCEDURE — 99999 PR PBB SHADOW E&M-EST. PATIENT-LVL III: CPT | Mod: PBBFAC,,, | Performed by: STUDENT IN AN ORGANIZED HEALTH CARE EDUCATION/TRAINING PROGRAM

## 2024-06-07 PROCEDURE — 3078F DIAST BP <80 MM HG: CPT | Mod: CPTII,,, | Performed by: STUDENT IN AN ORGANIZED HEALTH CARE EDUCATION/TRAINING PROGRAM

## 2024-06-07 PROCEDURE — 80053 COMPREHEN METABOLIC PANEL: CPT | Performed by: STUDENT IN AN ORGANIZED HEALTH CARE EDUCATION/TRAINING PROGRAM

## 2024-06-07 PROCEDURE — 99213 OFFICE O/P EST LOW 20 MIN: CPT | Mod: PBBFAC | Performed by: STUDENT IN AN ORGANIZED HEALTH CARE EDUCATION/TRAINING PROGRAM

## 2024-06-07 PROCEDURE — 3008F BODY MASS INDEX DOCD: CPT | Mod: CPTII,,, | Performed by: STUDENT IN AN ORGANIZED HEALTH CARE EDUCATION/TRAINING PROGRAM

## 2024-06-07 RX ORDER — DICYCLOMINE HYDROCHLORIDE 20 MG/1
20 TABLET ORAL EVERY 6 HOURS
Qty: 120 TABLET | Refills: 0 | Status: SHIPPED | OUTPATIENT
Start: 2024-06-07 | End: 2024-07-07

## 2024-06-07 RX ORDER — CHOLESTYRAMINE 4 G/4.8G
4 POWDER, FOR SUSPENSION ORAL DAILY
Qty: 90 PACKET | Refills: 3 | Status: SHIPPED | OUTPATIENT
Start: 2024-06-07

## 2024-06-07 NOTE — ASSESSMENT & PLAN NOTE
Slowly resuming to regular work and  routine at home after her father passed away.    Patient has discontinued use of daily lexapro.   Sleep inconsistently getting restful 6-8 hours of sleep.   - practice relaxation techniques by finding quiet moment to herself

## 2024-06-07 NOTE — PROGRESS NOTES
[facility]    HPI:  Bianca Richard is a 33 y.o. female who presents today for Diarrhea (Diarrhea for about 3 weeks.  She states the part that concerns her is the color and the smell.  She states the color is darker than usual and the smell is horrible. )    33 year old with anxiety and depression, chronic diarrhea with cyclical constipation that has been going on since removal of her gall bladder when she was 14 years old.   She generally have watery stools every 1-2 days occurring once or twice daily.   Started to have stool color changes and malodor starting 3 weeks.   Denies any changes in her diet, has modified eating sandwiches to salads to help with weight control.   Denies fever, chills, excessive thirst, chest pain, palpitations, dizziness, shortness of breath, abdominal pain, cramping pain, nausea, vomiting. Denies intolerance to food.   Patient denies recent use of antibiotics, sick contacts, travels outside the state.      ROS   A review of systems was performed and was negative except as noted above.    I personally reviewed allergies, past medical, surgical, social and family history and updated as appropriate.    Medications:    Current Outpatient Medications:     cholestyramine-aspartame (CHOLESTYRAMINE LIGHT) 4 gram PwPk, Take 1 packet (4 g total) by mouth once daily., Disp: 90 packet, Rfl: 3    dicyclomine (BENTYL) 20 mg tablet, Take 1 tablet (20 mg total) by mouth every 6 (six) hours., Disp: 120 tablet, Rfl: 0    EScitalopram oxalate (LEXAPRO) 10 MG tablet, Take 1 tablet (10 mg total) by mouth once daily. (Patient not taking: Reported on 6/7/2024), Disp: 30 tablet, Rfl: 11     Health Maintenance:  Immunization History   Administered Date(s) Administered    DTaP 01/28/1991, 04/22/1991, 09/09/1991, 05/03/1993, 08/04/1995    HIB 04/22/1991, 09/09/1991, 05/03/1993    Hepatitis B, Pediatric/Adolescent 09/22/2004, 11/03/2004, 04/01/2005    IPV 01/28/1991, 04/22/1991, 05/03/1993,  "08/04/1995    MMR 05/03/1993, 08/04/1995    Td (ADULT) 09/22/2004    Td - PF (ADULT) 09/22/2004    Tdap 01/11/2018    Varicella 09/22/2004      Health Maintenance   Topic Date Due    TETANUS VACCINE  01/11/2028    Hepatitis C Screening  Completed    Lipid Panel  Completed     Health Maintenance Topics with due status: Not Due       Topic Last Completion Date    TETANUS VACCINE 01/11/2018    Cervical Cancer Screening 02/23/2022    Influenza Vaccine Not Due     Health Maintenance Due   Topic Date Due    Pneumococcal Vaccines (Age 0-64) (1 of 2 - PCV) Never done    COVID-19 Vaccine (1 - 2023-24 season) Never done       PHYSICAL EXAM:  Vitals:    06/07/24 1305   BP: (!) 110/55   BP Location: Right arm   Patient Position: Sitting   BP Method: Large (Automatic)   Pulse: 73   Resp: 16   SpO2: 98%   Weight: 90.7 kg (200 lb)   Height: 5' 9" (1.753 m)     Body mass index is 29.53 kg/m².  Physical Exam  Vitals and nursing note reviewed.   Constitutional:       General: She is not in acute distress.     Appearance: Normal appearance. She is normal weight. She is not ill-appearing, toxic-appearing or diaphoretic.   HENT:      Head: Normocephalic and atraumatic.      Right Ear: External ear normal.      Left Ear: External ear normal.      Nose: No congestion or rhinorrhea.      Mouth/Throat:      Mouth: Mucous membranes are dry.      Pharynx: Oropharynx is clear.   Eyes:      Extraocular Movements: Extraocular movements intact.      Conjunctiva/sclera: Conjunctivae normal.   Cardiovascular:      Rate and Rhythm: Normal rate and regular rhythm.      Pulses: Normal pulses.      Heart sounds: Normal heart sounds. No murmur heard.  Pulmonary:      Effort: Pulmonary effort is normal. No respiratory distress.      Breath sounds: No stridor. No wheezing, rhonchi or rales.   Abdominal:      General: Abdomen is flat. There is no distension.      Palpations: Abdomen is soft. There is no mass.      Tenderness: There is no abdominal " tenderness. There is no right CVA tenderness, left CVA tenderness or guarding.   Musculoskeletal:         General: No swelling, tenderness or deformity. Normal range of motion.      Cervical back: Normal range of motion and neck supple. No rigidity or tenderness.      Right lower leg: No edema.      Left lower leg: No edema.   Lymphadenopathy:      Cervical: No cervical adenopathy.   Skin:     General: Skin is warm and dry.      Capillary Refill: Capillary refill takes less than 2 seconds.   Neurological:      General: No focal deficit present.      Mental Status: She is alert and oriented to person, place, and time. Mental status is at baseline.      Motor: No weakness.      Gait: Gait normal.   Psychiatric:         Mood and Affect: Mood normal.         Behavior: Behavior normal.         Thought Content: Thought content normal.         Judgment: Judgment normal.          ASSESSMENT/PLAN:  1. Diarrhea, unspecified type  -     CBC Auto Differential; Future; Expected date: 06/07/2024  -     Comprehensive Metabolic Panel; Future; Expected date: 06/07/2024  -     Calprotectin, Stool; Future; Expected date: 06/07/2024  -     Occult blood x 1, stool; Future; Expected date: 06/07/2024  -     WBC, Stool; Future; Expected date: 06/07/2024  -     Stool Exam-Ova,Cysts,Parasites; Future; Expected date: 06/07/2024  -     Stool culture; Future; Expected date: 06/07/2024  -     Clostridium difficile EIA; Future; Expected date: 06/07/2024  -     dicyclomine (BENTYL) 20 mg tablet; Take 1 tablet (20 mg total) by mouth every 6 (six) hours.  Dispense: 120 tablet; Refill: 0  -     TSH; Future; Expected date: 06/07/2024  -     cholestyramine-aspartame (CHOLESTYRAMINE LIGHT) 4 gram PwPk; Take 1 packet (4 g total) by mouth once daily.  Dispense: 90 packet; Refill: 3    2. S/P cholecystectomy  -     cholestyramine-aspartame (CHOLESTYRAMINE LIGHT) 4 gram PwPk; Take 1 packet (4 g total) by mouth once daily.  Dispense: 90 packet; Refill:  3    3. Fatigue due to depression  Assessment & Plan:  Slowly resuming to regular work and  routine at home after her father passed away.    Patient has discontinued use of daily lexapro.   Sleep inconsistently getting restful 6-8 hours of sleep.   - practice relaxation techniques by finding quiet moment to herself      4. Severe anxiety  Assessment & Plan:  Denies severe attacks.       5. Mixed hyperlipidemia  Assessment & Plan:  Current treatment:  Current Outpatient Medications on File Prior to Visit   Medication Sig Dispense Refill    EScitalopram oxalate (LEXAPRO) 10 MG tablet Take 1 tablet (10 mg total) by mouth once daily. (Patient not taking: Reported on 6/7/2024) 30 tablet 11    [DISCONTINUED] ALPRAZolam (XANAX) 0.25 MG tablet Take 1 tablet (0.25 mg total) by mouth 3 (three) times daily as needed for Anxiety. 15 tablet 0     No current facility-administered medications on file prior to visit.     Side effects from current treatment: none  Exercise: not active  Diet: cardiac diet  Family history of CAD: No  Family history of CVA: No    Lab Results   Component Value Date    CHOL 138 10/11/2022     Lab Results   Component Value Date    HDL 49 10/11/2022     Lab Results   Component Value Date    LDLCALC 78.8 10/11/2022     Lab Results   Component Value Date    TRIG 51 10/11/2022     Lab Results   Component Value Date    CHOLHDL 35.5 10/11/2022   - To help improve your cardiovascular health and reduce your risk of stroke or heart attack, the following healthy lifestyle changes are recommended.   - Choose whole food items, fresh/frozen fruits and vegetables and unprocessed meats.  - Avoid processed food items, cold cuts, canned foods in sugary and high sodium preservatives.   - Reduce intake of highly refined carbohydrates or white starchy foods, white bread, white flour pasta, sugary cereals, sugary drinks, sweet tea, soda including diet, candies, cakes and donuts.    - Reduce or avoid saturated fats  (fats from animals and processed oils like palm oil, peanut oil, vegetable oil) and fried food items.   - Increase healthy fats like omega-3 fish oil, fatty fish (salmon, mackerel, sardine etc), olive oil, avocado, raw nuts etc.   - Increase fiber intake with daily goal of 6-8 servings of vegetables.  - Remember to maintain daily adequate hydration with water 1.5 to 2 liters fluid volume.    - Daily physical activities, start slow with 10-15 minutes of walk daily, then increase as tolerated to twice daily.   - Cardiovascular exercise also improves your cholesterol levels and your goal is low intensity (like walking and biking) 150 min/week to moderate/high intensity 75 min/week.        6. BMI 28.0-28.9,adult  Assessment & Plan:  Wt Readings from Last 3 Encounters:   06/07/24 1305 90.7 kg (200 lb)   01/31/24 0920 83.9 kg (185 lb)   01/03/24 1428 85.3 kg (188 lb)   Recommendations:   Stay physically active. As tolerated alternate resistance training with stretching and cardio. Goal of 150 minutes per week of moderate intensity activity or 7,500 - 10,000 steps per day. Follow the Mediterranean Diet. Include whole fresh fruits, vegetables, olive oil, seeds, nuts, whole grains, cold water fish, salmon, mackerel and lean cuts of meat.  Do not drink sugary/diet carbonated beverages. Decrease portion sizes slightly which will result in an approximately 500-calorie deficit. Avoid fast or fried and processed food, especially canned foods. Avoid refined carbohydrates, white starchy foods, flour, white potato, bread, muffins, and cakes. Consider substituting one meal a day with a meal replacement such as Slim fast, lean cuisine, or weight watcher's. Follow a healthy diet that includes enough calcium, vitamin D and proteins for bone health.            Other than changes above, continue current medications and maintain follow up with specialists.      Follow up in about 2 weeks (around 6/21/2024) for Med recheck, Lab review.    Recent Results (from the past 2016 hour(s))   TSH    Collection Time: 06/07/24  1:46 PM   Result Value Ref Range    TSH 1.270 0.400 - 4.000 uIU/mL   Comprehensive Metabolic Panel    Collection Time: 06/07/24  1:46 PM   Result Value Ref Range    Sodium 140 136 - 145 mmol/L    Potassium 4.0 3.5 - 5.1 mmol/L    Chloride 107 95 - 110 mmol/L    CO2 28 23 - 29 mmol/L    Glucose 78 70 - 110 mg/dL    BUN 11 6 - 20 mg/dL    Creatinine 0.6 0.5 - 1.4 mg/dL    Calcium 8.7 8.7 - 10.5 mg/dL    Total Protein 6.4 6.0 - 8.4 g/dL    Albumin 3.5 3.5 - 5.2 g/dL    Total Bilirubin 0.3 0.1 - 1.0 mg/dL    Alkaline Phosphatase 60 55 - 135 U/L    AST 20 10 - 40 U/L    ALT 22 10 - 44 U/L    eGFR >60.0 >60 mL/min/1.73 m^2    Anion Gap 5 3 - 11 mmol/L   CBC Auto Differential    Collection Time: 06/07/24  1:46 PM   Result Value Ref Range    WBC 5.63 3.90 - 12.70 K/uL    RBC 3.79 (L) 4.00 - 5.40 M/uL    Hemoglobin 11.7 (L) 12.0 - 16.0 g/dL    Hematocrit 34.8 (L) 37.0 - 48.5 %    MCV 92 82 - 98 fL    MCH 30.9 27.0 - 31.0 pg    MCHC 33.6 32.0 - 36.0 g/dL    RDW 12.8 11.5 - 14.5 %    Platelets 238 150 - 450 K/uL    MPV 10.8 9.2 - 12.9 fL    Immature Granulocytes 0.4 0.0 - 0.5 %    Gran # (ANC) 3.2 1.8 - 7.7 K/uL    Immature Grans (Abs) 0.02 0.00 - 0.04 K/uL    Lymph # 1.8 1.0 - 4.8 K/uL    Mono # 0.5 0.3 - 1.0 K/uL    Eos # 0.1 0.0 - 0.5 K/uL    Baso # 0.02 0.00 - 0.20 K/uL    nRBC 0 0 /100 WBC    Gran % 57.2 38.0 - 73.0 %    Lymph % 31.4 18.0 - 48.0 %    Mono % 9.2 4.0 - 15.0 %    Eosinophil % 1.4 0.0 - 8.0 %    Basophil % 0.4 0.0 - 1.9 %    Differential Method Automated          Reinaldo Camara DO  [facility]

## 2024-06-07 NOTE — ASSESSMENT & PLAN NOTE
Current treatment:  Current Outpatient Medications on File Prior to Visit   Medication Sig Dispense Refill    EScitalopram oxalate (LEXAPRO) 10 MG tablet Take 1 tablet (10 mg total) by mouth once daily. (Patient not taking: Reported on 6/7/2024) 30 tablet 11    [DISCONTINUED] ALPRAZolam (XANAX) 0.25 MG tablet Take 1 tablet (0.25 mg total) by mouth 3 (three) times daily as needed for Anxiety. 15 tablet 0     No current facility-administered medications on file prior to visit.     Side effects from current treatment: none  Exercise: not active  Diet: cardiac diet  Family history of CAD: No  Family history of CVA: No    Lab Results   Component Value Date    CHOL 138 10/11/2022     Lab Results   Component Value Date    HDL 49 10/11/2022     Lab Results   Component Value Date    LDLCALC 78.8 10/11/2022     Lab Results   Component Value Date    TRIG 51 10/11/2022     Lab Results   Component Value Date    CHOLHDL 35.5 10/11/2022   - To help improve your cardiovascular health and reduce your risk of stroke or heart attack, the following healthy lifestyle changes are recommended.   - Choose whole food items, fresh/frozen fruits and vegetables and unprocessed meats.  - Avoid processed food items, cold cuts, canned foods in sugary and high sodium preservatives.   - Reduce intake of highly refined carbohydrates or white starchy foods, white bread, white flour pasta, sugary cereals, sugary drinks, sweet tea, soda including diet, candies, cakes and donuts.    - Reduce or avoid saturated fats (fats from animals and processed oils like palm oil, peanut oil, vegetable oil) and fried food items.   - Increase healthy fats like omega-3 fish oil, fatty fish (salmon, mackerel, sardine etc), olive oil, avocado, raw nuts etc.   - Increase fiber intake with daily goal of 6-8 servings of vegetables.  - Remember to maintain daily adequate hydration with water 1.5 to 2 liters fluid volume.    - Daily physical activities, start slow with 10-15  minutes of walk daily, then increase as tolerated to twice daily.   - Cardiovascular exercise also improves your cholesterol levels and your goal is low intensity (like walking and biking) 150 min/week to moderate/high intensity 75 min/week.

## 2024-06-07 NOTE — ASSESSMENT & PLAN NOTE
Wt Readings from Last 3 Encounters:   06/07/24 1305 90.7 kg (200 lb)   01/31/24 0920 83.9 kg (185 lb)   01/03/24 1428 85.3 kg (188 lb)   Recommendations:   Stay physically active. As tolerated alternate resistance training with stretching and cardio. Goal of 150 minutes per week of moderate intensity activity or 7,500 - 10,000 steps per day. Follow the Mediterranean Diet. Include whole fresh fruits, vegetables, olive oil, seeds, nuts, whole grains, cold water fish, salmon, mackerel and lean cuts of meat.  Do not drink sugary/diet carbonated beverages. Decrease portion sizes slightly which will result in an approximately 500-calorie deficit. Avoid fast or fried and processed food, especially canned foods. Avoid refined carbohydrates, white starchy foods, flour, white potato, bread, muffins, and cakes. Consider substituting one meal a day with a meal replacement such as Slim fast, lean cuisine, or weight watcher's. Follow a healthy diet that includes enough calcium, vitamin D and proteins for bone health.

## 2024-06-09 PROBLEM — R19.7 DIARRHEA: Status: ACTIVE | Noted: 2024-06-09

## 2024-06-09 PROBLEM — Z90.49 S/P CHOLECYSTECTOMY: Status: ACTIVE | Noted: 2024-06-09

## 2024-06-13 ENCOUNTER — PATIENT MESSAGE (OUTPATIENT)
Dept: PRIMARY CARE CLINIC | Facility: CLINIC | Age: 34
End: 2024-06-13
Payer: MEDICAID

## 2024-07-23 ENCOUNTER — OFFICE VISIT (OUTPATIENT)
Dept: PRIMARY CARE CLINIC | Facility: CLINIC | Age: 34
End: 2024-07-23
Payer: MEDICAID

## 2024-07-23 VITALS
HEART RATE: 60 BPM | WEIGHT: 202 LBS | RESPIRATION RATE: 16 BRPM | OXYGEN SATURATION: 98 % | SYSTOLIC BLOOD PRESSURE: 97 MMHG | BODY MASS INDEX: 29.92 KG/M2 | HEIGHT: 69 IN | DIASTOLIC BLOOD PRESSURE: 55 MMHG

## 2024-07-23 DIAGNOSIS — J06.9 UPPER RESPIRATORY TRACT INFECTION, UNSPECIFIED TYPE: ICD-10-CM

## 2024-07-23 DIAGNOSIS — E78.2 MIXED HYPERLIPIDEMIA: ICD-10-CM

## 2024-07-23 DIAGNOSIS — R07.0 THROAT PAIN: Primary | ICD-10-CM

## 2024-07-23 DIAGNOSIS — I95.0 IDIOPATHIC HYPOTENSION: ICD-10-CM

## 2024-07-23 DIAGNOSIS — R53.83 FATIGUE DUE TO DEPRESSION: ICD-10-CM

## 2024-07-23 DIAGNOSIS — K12.0 APHTHOUS ULCER OF MOUTH: ICD-10-CM

## 2024-07-23 DIAGNOSIS — F17.210 MODERATE CIGARETTE SMOKER (10-19 PER DAY): ICD-10-CM

## 2024-07-23 DIAGNOSIS — F32.A FATIGUE DUE TO DEPRESSION: ICD-10-CM

## 2024-07-23 DIAGNOSIS — D53.9 ANEMIA ASSOCIATED WITH NUTRITIONAL DEFICIENCY: Chronic | ICD-10-CM

## 2024-07-23 DIAGNOSIS — R19.7 DIARRHEA, UNSPECIFIED TYPE: ICD-10-CM

## 2024-07-23 DIAGNOSIS — Z90.49 S/P CHOLECYSTECTOMY: ICD-10-CM

## 2024-07-23 PROBLEM — F41.9 SEVERE ANXIETY: Status: RESOLVED | Noted: 2024-01-04 | Resolved: 2024-07-23

## 2024-07-23 LAB
ANION GAP SERPL CALC-SCNC: 8 MMOL/L (ref 3–11)
BASOPHILS # BLD AUTO: 0.02 K/UL (ref 0–0.2)
BASOPHILS NFR BLD: 0.4 % (ref 0–1.9)
BUN SERPL-MCNC: 10 MG/DL (ref 6–20)
CALCIUM SERPL-MCNC: 8.4 MG/DL (ref 8.7–10.5)
CHLORIDE SERPL-SCNC: 104 MMOL/L (ref 95–110)
CO2 SERPL-SCNC: 29 MMOL/L (ref 23–29)
CREAT SERPL-MCNC: 0.7 MG/DL (ref 0.5–1.4)
DIFFERENTIAL METHOD BLD: ABNORMAL
EOSINOPHIL # BLD AUTO: 0 K/UL (ref 0–0.5)
EOSINOPHIL NFR BLD: 0.8 % (ref 0–8)
ERYTHROCYTE [DISTWIDTH] IN BLOOD BY AUTOMATED COUNT: 12.8 % (ref 11.5–14.5)
EST. GFR  (NO RACE VARIABLE): >60 ML/MIN/1.73 M^2
FERRITIN SERPL-MCNC: 35 NG/ML (ref 20–300)
GLUCOSE SERPL-MCNC: 62 MG/DL (ref 70–110)
HCT VFR BLD AUTO: 35.5 % (ref 37–48.5)
HGB BLD-MCNC: 12 G/DL (ref 12–16)
IMM GRANULOCYTES # BLD AUTO: 0.02 K/UL (ref 0–0.04)
IMM GRANULOCYTES NFR BLD AUTO: 0.4 % (ref 0–0.5)
IRON SATN MFR SERPL: 30 % (ref 20–50)
IRON SERPL-MCNC: 88 UG/DL (ref 30–160)
LYMPHOCYTES # BLD AUTO: 1.7 K/UL (ref 1–4.8)
LYMPHOCYTES NFR BLD: 32 % (ref 18–48)
MCH RBC QN AUTO: 30.9 PG (ref 27–31)
MCHC RBC AUTO-ENTMCNC: 33.8 G/DL (ref 32–36)
MCV RBC AUTO: 92 FL (ref 82–98)
MONOCYTES # BLD AUTO: 0.5 K/UL (ref 0.3–1)
MONOCYTES NFR BLD: 8.9 % (ref 4–15)
NEUTROPHILS # BLD AUTO: 3 K/UL (ref 1.8–7.7)
NEUTROPHILS NFR BLD: 57.5 % (ref 38–73)
NRBC BLD-RTO: 0 /100 WBC
PLATELET # BLD AUTO: 217 K/UL (ref 150–450)
PMV BLD AUTO: 10.3 FL (ref 9.2–12.9)
POTASSIUM SERPL-SCNC: 3.6 MMOL/L (ref 3.5–5.1)
RBC # BLD AUTO: 3.88 M/UL (ref 4–5.4)
SODIUM SERPL-SCNC: 141 MMOL/L (ref 136–145)
TOTAL IRON BINDING CAPACITY: 296 UG/DL (ref 250–450)
VIT B12 SERPL-MCNC: 208 PG/ML (ref 210–950)
WBC # BLD AUTO: 5.18 K/UL (ref 3.9–12.7)

## 2024-07-23 PROCEDURE — 82728 ASSAY OF FERRITIN: CPT | Performed by: STUDENT IN AN ORGANIZED HEALTH CARE EDUCATION/TRAINING PROGRAM

## 2024-07-23 PROCEDURE — 36415 COLL VENOUS BLD VENIPUNCTURE: CPT | Mod: PBBFAC

## 2024-07-23 PROCEDURE — 99213 OFFICE O/P EST LOW 20 MIN: CPT | Mod: PBBFAC | Performed by: STUDENT IN AN ORGANIZED HEALTH CARE EDUCATION/TRAINING PROGRAM

## 2024-07-23 PROCEDURE — 3078F DIAST BP <80 MM HG: CPT | Mod: CPTII,,, | Performed by: STUDENT IN AN ORGANIZED HEALTH CARE EDUCATION/TRAINING PROGRAM

## 2024-07-23 PROCEDURE — 99999PBSHW PR PBB SHADOW TECHNICAL ONLY FILED TO HB: Mod: PBBFAC,,,

## 2024-07-23 PROCEDURE — 3074F SYST BP LT 130 MM HG: CPT | Mod: CPTII,,, | Performed by: STUDENT IN AN ORGANIZED HEALTH CARE EDUCATION/TRAINING PROGRAM

## 2024-07-23 PROCEDURE — 99214 OFFICE O/P EST MOD 30 MIN: CPT | Mod: S$PBB,,, | Performed by: STUDENT IN AN ORGANIZED HEALTH CARE EDUCATION/TRAINING PROGRAM

## 2024-07-23 PROCEDURE — 80048 BASIC METABOLIC PNL TOTAL CA: CPT | Performed by: STUDENT IN AN ORGANIZED HEALTH CARE EDUCATION/TRAINING PROGRAM

## 2024-07-23 PROCEDURE — 1159F MED LIST DOCD IN RCRD: CPT | Mod: CPTII,,, | Performed by: STUDENT IN AN ORGANIZED HEALTH CARE EDUCATION/TRAINING PROGRAM

## 2024-07-23 PROCEDURE — 99999 PR PBB SHADOW E&M-EST. PATIENT-LVL III: CPT | Mod: PBBFAC,,, | Performed by: STUDENT IN AN ORGANIZED HEALTH CARE EDUCATION/TRAINING PROGRAM

## 2024-07-23 PROCEDURE — 3008F BODY MASS INDEX DOCD: CPT | Mod: CPTII,,, | Performed by: STUDENT IN AN ORGANIZED HEALTH CARE EDUCATION/TRAINING PROGRAM

## 2024-07-23 PROCEDURE — 82607 VITAMIN B-12: CPT | Performed by: STUDENT IN AN ORGANIZED HEALTH CARE EDUCATION/TRAINING PROGRAM

## 2024-07-23 PROCEDURE — 83540 ASSAY OF IRON: CPT | Performed by: STUDENT IN AN ORGANIZED HEALTH CARE EDUCATION/TRAINING PROGRAM

## 2024-07-23 PROCEDURE — 83550 IRON BINDING TEST: CPT | Performed by: STUDENT IN AN ORGANIZED HEALTH CARE EDUCATION/TRAINING PROGRAM

## 2024-07-23 PROCEDURE — 83921 ORGANIC ACID SINGLE QUANT: CPT | Performed by: STUDENT IN AN ORGANIZED HEALTH CARE EDUCATION/TRAINING PROGRAM

## 2024-07-23 PROCEDURE — 85025 COMPLETE CBC W/AUTO DIFF WBC: CPT | Performed by: STUDENT IN AN ORGANIZED HEALTH CARE EDUCATION/TRAINING PROGRAM

## 2024-07-23 PROCEDURE — 1160F RVW MEDS BY RX/DR IN RCRD: CPT | Mod: CPTII,,, | Performed by: STUDENT IN AN ORGANIZED HEALTH CARE EDUCATION/TRAINING PROGRAM

## 2024-07-23 RX ORDER — CHOLESTYRAMINE 4 G/4.8G
4 POWDER, FOR SUSPENSION ORAL DAILY
Qty: 90 PACKET | Refills: 3 | Status: SHIPPED | OUTPATIENT
Start: 2024-07-23

## 2024-07-23 RX ORDER — SUCRALFATE 1 G/1
1 TABLET ORAL
Qty: 120 TABLET | Refills: 2 | Status: SHIPPED | OUTPATIENT
Start: 2024-07-23

## 2024-07-23 NOTE — ASSESSMENT & PLAN NOTE
BP Readings from Last 3 Encounters:   07/23/24 (!) 97/55   06/07/24 (!) 110/55   01/31/24 117/89   Denies associated symptoms.   Continue to monitor.

## 2024-07-23 NOTE — ASSESSMENT & PLAN NOTE
Lab Results   Component Value Date    IRON 76 10/11/2022    TIBC 266 10/11/2022    LABIRON 29 10/11/2022

## 2024-07-23 NOTE — ASSESSMENT & PLAN NOTE
Wt Readings from Last 3 Encounters:   07/23/24 1419 91.6 kg (202 lb)   06/07/24 1305 90.7 kg (200 lb)   01/31/24 0920 83.9 kg (185 lb)   Recommendations:   Stay physically active. As tolerated alternate resistance training with stretching and cardio. Goal of 150 minutes per week of moderate intensity activity or 7,500 - 10,000 steps per day. Follow the Mediterranean Diet. Include whole fresh fruits, vegetables, olive oil, seeds, nuts, whole grains, cold water fish, salmon, mackerel and lean cuts of meat.  Do not drink sugary/diet carbonated beverages. Decrease portion sizes slightly which will result in an approximately 500-calorie deficit. Avoid fast or fried and processed food, especially canned foods. Avoid refined carbohydrates, white starchy foods, flour, white potato, bread, muffins, and cakes. Consider substituting one meal a day with a meal replacement such as Slim fast, lean cuisine, or weight watcher's. Follow a healthy diet that includes enough calcium, vitamin D and proteins for bone health.

## 2024-07-23 NOTE — PROGRESS NOTES
[facility] Note    HPI:  Bianca Richard is a 33 y.o. female who presents today for Cough (Cough and sore throat for 2 days.  She states she also has pain inside her mouth)    33 year old with anxiety and depression, chronic diarrhea with cyclical constipation that has been going on since removal of her gall bladder when she was 14 years old.   She generally have watery stools every 1-2 days occurring once or twice daily.   Started to have stool color changes and malodor starting 3 weeks.   Denies any changes in her diet, has modified eating sandwiches to salads to help with weight control.   Denies fever, chills, excessive thirst, chest pain, palpitations, dizziness, shortness of breath, abdominal pain, cramping pain, nausea, vomiting. Denies intolerance to food.   Patient denies recent use of antibiotics, sick contacts, travels outside the [facility].     ROS   A review of systems was performed and was negative except as noted above.    I personally reviewed allergies, past medical, surgical, social and family history and updated as appropriate.    Medications:    Current Outpatient Medications:     EScitalopram oxalate (LEXAPRO) 10 MG tablet, Take 1 tablet (10 mg total) by mouth once daily., Disp: 30 tablet, Rfl: 11    cholestyramine-aspartame (CHOLESTYRAMINE LIGHT) 4 gram PwPk, Take 1 packet (4 g total) by mouth once daily., Disp: 90 packet, Rfl: 3    cyanocobalamin (VITAMIN B-12) 1000 MCG tablet, Take 0.5 tablets (500 mcg total) by mouth 2 (two) times a day., Disp: 60 tablet, Rfl: 5    iron polysacch complex-b12-fa 150-25-1 mg-mcg-mg (POLY-IRON 150 FORTE) 150-25-1 mg-mcg-mg Cap, Take 1 capsule by mouth every 48 hours., Disp: 60 capsule, Rfl: 2    sucralfate (CARAFATE) 1 gram tablet, Take 1 tablet (1 g total) by mouth 4 (four) times daily before meals and nightly., Disp: 120 tablet, Rfl: 2     Health Maintenance:  Immunization History   Administered Date(s) Administered    DTaP 01/28/1991,  "04/22/1991, 09/09/1991, 05/03/1993, 08/04/1995    HIB 04/22/1991, 09/09/1991, 05/03/1993    Hepatitis B, Pediatric/Adolescent 09/22/2004, 11/03/2004, 04/01/2005    IPV 01/28/1991, 04/22/1991, 05/03/1993, 08/04/1995    MMR 05/03/1993, 08/04/1995    Td (ADULT) 09/22/2004    Td - PF (ADULT) 09/22/2004    Tdap 01/11/2018    Varicella 09/22/2004      Health Maintenance   Topic Date Due    TETANUS VACCINE  01/11/2028    Hepatitis C Screening  Completed    Lipid Panel  Completed     Health Maintenance Topics with due status: Not Due       Topic Last Completion Date    TETANUS VACCINE 01/11/2018    Cervical Cancer Screening 02/23/2022    Influenza Vaccine Not Due     Health Maintenance Due   Topic Date Due    Pneumococcal Vaccines (Age 0-64) (1 of 2 - PCV) Never done    COVID-19 Vaccine (1 - 2023-24 season) Never done       PHYSICAL EXAM:  Vitals:    07/23/24 1419   BP: (!) 97/55   BP Location: Right arm   Patient Position: Sitting   BP Method: Large (Automatic)   Pulse: 60   Resp: 16   SpO2: 98%   Weight: 91.6 kg (202 lb)   Height: 5' 9" (1.753 m)     Body mass index is 29.83 kg/m².  Physical Exam  Vitals and nursing note reviewed.   Constitutional:       General: She is not in acute distress.     Appearance: Normal appearance. She is normal weight. She is not ill-appearing, toxic-appearing or diaphoretic.   HENT:      Head: Normocephalic and atraumatic.      Right Ear: External ear normal.      Left Ear: External ear normal.      Nose: No congestion or rhinorrhea.      Mouth/Throat:      Mouth: Mucous membranes are dry.      Pharynx: Oropharynx is clear.   Eyes:      Extraocular Movements: Extraocular movements intact.      Conjunctiva/sclera: Conjunctivae normal.   Cardiovascular:      Rate and Rhythm: Normal rate and regular rhythm.      Pulses: Normal pulses.      Heart sounds: Normal heart sounds. No murmur heard.  Pulmonary:      Effort: Pulmonary effort is normal. No respiratory distress.      Breath sounds: No " stridor. No wheezing, rhonchi or rales.   Abdominal:      General: Abdomen is flat. There is no distension.      Palpations: Abdomen is soft. There is no mass.      Tenderness: There is no abdominal tenderness. There is no right CVA tenderness, left CVA tenderness or guarding.   Musculoskeletal:         General: No swelling, tenderness or deformity. Normal range of motion.      Cervical back: Normal range of motion and neck supple. No rigidity or tenderness.      Right lower leg: No edema.      Left lower leg: No edema.   Lymphadenopathy:      Cervical: No cervical adenopathy.   Skin:     General: Skin is warm and dry.      Capillary Refill: Capillary refill takes less than 2 seconds.   Neurological:      General: No focal deficit present.      Mental Status: She is alert and oriented to person, place, and time. Mental status is at baseline.      Motor: No weakness.      Gait: Gait normal.   Psychiatric:         Mood and Affect: Mood normal.         Behavior: Behavior normal.         Thought Content: Thought content normal.         Judgment: Judgment normal.          ASSESSMENT/PLAN:  1. Throat pain  Assessment & Plan:  Acutely started two days ago. Denies difficulty swallowing solids and liquids. Intermittent cough. Denies blood, thickened sputum.   Denies fever, chills, shortness of breath, palpitations. Denies reflux symptoms. Denies appetite change.   POC COVID 19 negative.   - counseled on conservative management with maintaining adequate hydration  - f/u CBC and anemia work up  - f/u one week or sooner with worsening    Orders:  -     sucralfate (CARAFATE) 1 gram tablet; Take 1 tablet (1 g total) by mouth 4 (four) times daily before meals and nightly.  Dispense: 120 tablet; Refill: 2    2. Upper respiratory tract infection, unspecified type    3. Idiopathic hypotension  Assessment & Plan:  BP Readings from Last 3 Encounters:   07/23/24 (!) 97/55   06/07/24 (!) 110/55   01/31/24 117/89   Denies associated  symptoms.   Continue to monitor.       4. Diarrhea, unspecified type  Assessment & Plan:  Chronic. Since removal of gall bladder.   Small improvement since start of cholesstyramine. Stool studies pending.   - continue daily builking agent      Orders:  -     Basic Metabolic Panel; Future; Expected date: 07/23/2024  -     cholestyramine-aspartame (CHOLESTYRAMINE LIGHT) 4 gram PwPk; Take 1 packet (4 g total) by mouth once daily.  Dispense: 90 packet; Refill: 3    5. Anemia associated with nutritional deficiency  Overview:  Lab Results   Component Value Date    HGB 11.7 (L) 06/07/2024    HGB 12.5 10/11/2022    HGB 12.1 (L) 07/03/2020     Lab Results   Component Value Date    HCT 34.8 (L) 06/07/2024    HCT 36.9 (L) 10/11/2022    HCT 37.0 (L) 07/03/2020        Assessment & Plan:  Lab Results   Component Value Date    IRON 76 10/11/2022    TIBC 266 10/11/2022    LABIRON 29 10/11/2022         Orders:  -     CBC Auto Differential; Future; Expected date: 07/23/2024  -     Iron and TIBC; Future; Expected date: 07/23/2024  -     Ferritin; Future; Expected date: 07/23/2024  -     Vitamin B12; Future; Expected date: 07/23/2024  -     cyanocobalamin (VITAMIN B-12) 1000 MCG tablet; Take 0.5 tablets (500 mcg total) by mouth 2 (two) times a day.  Dispense: 60 tablet; Refill: 5  -     iron polysacch complex-b12-fa 150-25-1 mg-mcg-mg (POLY-IRON 150 FORTE) 150-25-1 mg-mcg-mg Cap; Take 1 capsule by mouth every 48 hours.  Dispense: 60 capsule; Refill: 2    6. Fatigue due to depression  Assessment & Plan:  Back to regular work and  routine at home. Busy with baseball season with son's extracurricular activities. Sleeping well, not waking up feeling tired.   Patient has discontinued use of daily lexapro.   - continue practice relaxation techniques by finding quiet moment to herself      7. S/P cholecystectomy  -     cholestyramine-aspartame (CHOLESTYRAMINE LIGHT) 4 gram PwPk; Take 1 packet (4 g total) by mouth once daily.   Dispense: 90 packet; Refill: 3    8. Moderate cigarette smoker (10-19 per day)  Assessment & Plan:  Still smoking 1/2 pack per day for over eleven years.  Has not increased daily smoking. Patient currently is not ready to quit. She understands  to smoking cessation will be discussed in future visits.       9. Mixed hyperlipidemia  Assessment & Plan:  Current treatment:  Current Outpatient Medications on File Prior to Visit   Medication Sig Dispense Refill    cholestyramine-aspartame (CHOLESTYRAMINE LIGHT) 4 gram PwPk Take 1 packet (4 g total) by mouth once daily. 90 packet 3    EScitalopram oxalate (LEXAPRO) 10 MG tablet Take 1 tablet (10 mg total) by mouth once daily. (Patient not taking: Reported on 6/7/2024) 30 tablet 11     No current facility-administered medications on file prior to visit.     Side effects from current treatment: none  Exercise: not active  Diet: cardiac diet  Family history of CAD: No  Family history of CVA: No    Lab Results   Component Value Date    CHOL 138 10/11/2022     Lab Results   Component Value Date    HDL 49 10/11/2022     Lab Results   Component Value Date    LDLCALC 78.8 10/11/2022     Lab Results   Component Value Date    TRIG 51 10/11/2022     Lab Results   Component Value Date    CHOLHDL 35.5 10/11/2022   - To help improve your cardiovascular health and reduce your risk of stroke or heart attack, the following healthy lifestyle changes are recommended.   - Choose whole food items, fresh/frozen fruits and vegetables and unprocessed meats.  - Avoid processed food items, cold cuts, canned foods in sugary and high sodium preservatives.   - Reduce intake of highly refined carbohydrates or white starchy foods, white bread, white flour pasta, sugary cereals, sugary drinks, sweet tea, soda including diet, candies, cakes and donuts.    - Reduce or avoid saturated fats (fats from animals and processed oils like palm oil, peanut oil, vegetable oil) and fried food items.   -  Increase healthy fats like omega-3 fish oil, fatty fish (salmon, mackerel, sardine etc), olive oil, avocado, raw nuts etc.   - Increase fiber intake with daily goal of 6-8 servings of vegetables.  - Remember to maintain daily adequate hydration with water 1.5 to 2 liters fluid volume.    - Daily physical activities, start slow with 10-15 minutes of walk daily, then increase as tolerated to twice daily.   - Cardiovascular exercise also improves your cholesterol levels and your goal is low intensity (like walking and biking) 150 min/week to moderate/high intensity 75 min/week.        10. BMI 28.0-28.9,adult  Assessment & Plan:  Wt Readings from Last 3 Encounters:   07/23/24 1419 91.6 kg (202 lb)   06/07/24 1305 90.7 kg (200 lb)   01/31/24 0920 83.9 kg (185 lb)   Recommendations:   Stay physically active. As tolerated alternate resistance training with stretching and cardio. Goal of 150 minutes per week of moderate intensity activity or 7,500 - 10,000 steps per day. Follow the Mediterranean Diet. Include whole fresh fruits, vegetables, olive oil, seeds, nuts, whole grains, cold water fish, salmon, mackerel and lean cuts of meat.  Do not drink sugary/diet carbonated beverages. Decrease portion sizes slightly which will result in an approximately 500-calorie deficit. Avoid fast or fried and processed food, especially canned foods. Avoid refined carbohydrates, white starchy foods, flour, white potato, bread, muffins, and cakes. Consider substituting one meal a day with a meal replacement such as Slim fast, lean cuisine, or weight watcher's. Follow a healthy diet that includes enough calcium, vitamin D and proteins for bone health.        11. Aphthous ulcer of mouth  Assessment & Plan:  Due to vitamin B12 deficiency, iron.   Start daily supplementation.  - f/u 6 months    Orders:  -     cyanocobalamin (VITAMIN B-12) 1000 MCG tablet; Take 0.5 tablets (500 mcg total) by mouth 2 (two) times a day.  Dispense: 60 tablet;  Refill: 5  -     iron polysacch complex-b12-fa 150-25-1 mg-mcg-mg (POLY-IRON 150 FORTE) 150-25-1 mg-mcg-mg Cap; Take 1 capsule by mouth every 48 hours.  Dispense: 60 capsule; Refill: 2    Other orders  -     Cancel: Methylmalonic Acid, Serum        Other than changes above, continue current medications and maintain follow up with specialists.      No follow-ups on file.   Recent Results (from the past 2016 hour(s))   TSH    Collection Time: 06/07/24  1:46 PM   Result Value Ref Range    TSH 1.270 0.400 - 4.000 uIU/mL   Comprehensive Metabolic Panel    Collection Time: 06/07/24  1:46 PM   Result Value Ref Range    Sodium 140 136 - 145 mmol/L    Potassium 4.0 3.5 - 5.1 mmol/L    Chloride 107 95 - 110 mmol/L    CO2 28 23 - 29 mmol/L    Glucose 78 70 - 110 mg/dL    BUN 11 6 - 20 mg/dL    Creatinine 0.6 0.5 - 1.4 mg/dL    Calcium 8.7 8.7 - 10.5 mg/dL    Total Protein 6.4 6.0 - 8.4 g/dL    Albumin 3.5 3.5 - 5.2 g/dL    Total Bilirubin 0.3 0.1 - 1.0 mg/dL    Alkaline Phosphatase 60 55 - 135 U/L    AST 20 10 - 40 U/L    ALT 22 10 - 44 U/L    eGFR >60.0 >60 mL/min/1.73 m^2    Anion Gap 5 3 - 11 mmol/L   CBC Auto Differential    Collection Time: 06/07/24  1:46 PM   Result Value Ref Range    WBC 5.63 3.90 - 12.70 K/uL    RBC 3.79 (L) 4.00 - 5.40 M/uL    Hemoglobin 11.7 (L) 12.0 - 16.0 g/dL    Hematocrit 34.8 (L) 37.0 - 48.5 %    MCV 92 82 - 98 fL    MCH 30.9 27.0 - 31.0 pg    MCHC 33.6 32.0 - 36.0 g/dL    RDW 12.8 11.5 - 14.5 %    Platelets 238 150 - 450 K/uL    MPV 10.8 9.2 - 12.9 fL    Immature Granulocytes 0.4 0.0 - 0.5 %    Gran # (ANC) 3.2 1.8 - 7.7 K/uL    Immature Grans (Abs) 0.02 0.00 - 0.04 K/uL    Lymph # 1.8 1.0 - 4.8 K/uL    Mono # 0.5 0.3 - 1.0 K/uL    Eos # 0.1 0.0 - 0.5 K/uL    Baso # 0.02 0.00 - 0.20 K/uL    nRBC 0 0 /100 WBC    Gran % 57.2 38.0 - 73.0 %    Lymph % 31.4 18.0 - 48.0 %    Mono % 9.2 4.0 - 15.0 %    Eosinophil % 1.4 0.0 - 8.0 %    Basophil % 0.4 0.0 - 1.9 %    Differential Method Automated    Basic  Metabolic Panel    Collection Time: 07/23/24  3:10 PM   Result Value Ref Range    Sodium 141 136 - 145 mmol/L    Potassium 3.6 3.5 - 5.1 mmol/L    Chloride 104 95 - 110 mmol/L    CO2 29 23 - 29 mmol/L    Glucose 62 (L) 70 - 110 mg/dL    BUN 10 6 - 20 mg/dL    Creatinine 0.7 0.5 - 1.4 mg/dL    Calcium 8.4 (L) 8.7 - 10.5 mg/dL    Anion Gap 8 3 - 11 mmol/L    eGFR >60.0 >60 mL/min/1.73 m^2   Vitamin B12    Collection Time: 07/23/24  3:10 PM   Result Value Ref Range    Vitamin B-12 208 (L) 210 - 950 pg/mL   Ferritin    Collection Time: 07/23/24  3:10 PM   Result Value Ref Range    Ferritin 35 20.0 - 300.0 ng/mL   Iron and TIBC    Collection Time: 07/23/24  3:10 PM   Result Value Ref Range    Iron 88 30 - 160 ug/dL    TIBC 296 250 - 450 ug/dL    Iron Saturation 30 20 - 50 %   CBC Auto Differential    Collection Time: 07/23/24  3:10 PM   Result Value Ref Range    WBC 5.18 3.90 - 12.70 K/uL    RBC 3.88 (L) 4.00 - 5.40 M/uL    Hemoglobin 12.0 12.0 - 16.0 g/dL    Hematocrit 35.5 (L) 37.0 - 48.5 %    MCV 92 82 - 98 fL    MCH 30.9 27.0 - 31.0 pg    MCHC 33.8 32.0 - 36.0 g/dL    RDW 12.8 11.5 - 14.5 %    Platelets 217 150 - 450 K/uL    MPV 10.3 9.2 - 12.9 fL    Immature Granulocytes 0.4 0.0 - 0.5 %    Gran # (ANC) 3.0 1.8 - 7.7 K/uL    Immature Grans (Abs) 0.02 0.00 - 0.04 K/uL    Lymph # 1.7 1.0 - 4.8 K/uL    Mono # 0.5 0.3 - 1.0 K/uL    Eos # 0.0 0.0 - 0.5 K/uL    Baso # 0.02 0.00 - 0.20 K/uL    nRBC 0 0 /100 WBC    Gran % 57.5 38.0 - 73.0 %    Lymph % 32.0 18.0 - 48.0 %    Mono % 8.9 4.0 - 15.0 %    Eosinophil % 0.8 0.0 - 8.0 %    Basophil % 0.4 0.0 - 1.9 %    Differential Method Automated          Reinaldo Camara DO  [facility]

## 2024-07-23 NOTE — ASSESSMENT & PLAN NOTE
Acutely started two days ago. Denies difficulty swallowing solids and liquids. Intermittent cough. Denies blood, thickened sputum.   Denies fever, chills, shortness of breath, palpitations. Denies reflux symptoms. Denies appetite change.   POC COVID 19 negative.   - counseled on conservative management with maintaining adequate hydration  - f/u CBC and anemia work up  - f/u one week or sooner with worsening

## 2024-07-23 NOTE — ASSESSMENT & PLAN NOTE
Chronic. Since removal of gall bladder.   Small improvement since start of cholesstyramine. Stool studies pending.   - continue daily builking agent

## 2024-07-23 NOTE — ASSESSMENT & PLAN NOTE
Current treatment:  Current Outpatient Medications on File Prior to Visit   Medication Sig Dispense Refill    cholestyramine-aspartame (CHOLESTYRAMINE LIGHT) 4 gram PwPk Take 1 packet (4 g total) by mouth once daily. 90 packet 3    EScitalopram oxalate (LEXAPRO) 10 MG tablet Take 1 tablet (10 mg total) by mouth once daily. (Patient not taking: Reported on 6/7/2024) 30 tablet 11     No current facility-administered medications on file prior to visit.     Side effects from current treatment: none  Exercise: not active  Diet: cardiac diet  Family history of CAD: No  Family history of CVA: No    Lab Results   Component Value Date    CHOL 138 10/11/2022     Lab Results   Component Value Date    HDL 49 10/11/2022     Lab Results   Component Value Date    LDLCALC 78.8 10/11/2022     Lab Results   Component Value Date    TRIG 51 10/11/2022     Lab Results   Component Value Date    CHOLHDL 35.5 10/11/2022   - To help improve your cardiovascular health and reduce your risk of stroke or heart attack, the following healthy lifestyle changes are recommended.   - Choose whole food items, fresh/frozen fruits and vegetables and unprocessed meats.  - Avoid processed food items, cold cuts, canned foods in sugary and high sodium preservatives.   - Reduce intake of highly refined carbohydrates or white starchy foods, white bread, white flour pasta, sugary cereals, sugary drinks, sweet tea, soda including diet, candies, cakes and donuts.    - Reduce or avoid saturated fats (fats from animals and processed oils like palm oil, peanut oil, vegetable oil) and fried food items.   - Increase healthy fats like omega-3 fish oil, fatty fish (salmon, mackerel, sardine etc), olive oil, avocado, raw nuts etc.   - Increase fiber intake with daily goal of 6-8 servings of vegetables.  - Remember to maintain daily adequate hydration with water 1.5 to 2 liters fluid volume.    - Daily physical activities, start slow with 10-15 minutes of walk daily,  then increase as tolerated to twice daily.   - Cardiovascular exercise also improves your cholesterol levels and your goal is low intensity (like walking and biking) 150 min/week to moderate/high intensity 75 min/week.

## 2024-07-23 NOTE — ASSESSMENT & PLAN NOTE
Back to regular work and  routine at home. Busy with baseball season with son's extracurricular activities. Sleeping well, not waking up feeling tired.   Patient has discontinued use of daily lexapro.   - continue practice relaxation techniques by finding quiet moment to herself

## 2024-07-24 PROBLEM — K12.0 APHTHOUS ULCER OF MOUTH: Status: ACTIVE | Noted: 2024-07-23

## 2024-07-24 RX ORDER — LANOLIN ALCOHOL/MO/W.PET/CERES
500 CREAM (GRAM) TOPICAL 2 TIMES DAILY
Qty: 60 TABLET | Refills: 5 | Status: SHIPPED | OUTPATIENT
Start: 2024-07-24

## 2024-07-24 RX ORDER — IRON PS COMPLEX/B12/FOLIC ACID 150-25-1
1 CAPSULE ORAL
Qty: 60 CAPSULE | Refills: 2 | Status: SHIPPED | OUTPATIENT
Start: 2024-07-24 | End: 2025-07-24

## 2024-12-12 ENCOUNTER — OFFICE VISIT (OUTPATIENT)
Dept: PRIMARY CARE CLINIC | Facility: CLINIC | Age: 34
End: 2024-12-12
Payer: MEDICAID

## 2024-12-12 VITALS
OXYGEN SATURATION: 100 % | WEIGHT: 204.63 LBS | HEIGHT: 69 IN | HEART RATE: 68 BPM | TEMPERATURE: 99 F | BODY MASS INDEX: 30.31 KG/M2 | DIASTOLIC BLOOD PRESSURE: 84 MMHG | SYSTOLIC BLOOD PRESSURE: 127 MMHG | RESPIRATION RATE: 18 BRPM

## 2024-12-12 DIAGNOSIS — R09.81 SINUS CONGESTION: Primary | ICD-10-CM

## 2024-12-12 PROCEDURE — 1159F MED LIST DOCD IN RCRD: CPT | Mod: CPTII,,, | Performed by: STUDENT IN AN ORGANIZED HEALTH CARE EDUCATION/TRAINING PROGRAM

## 2024-12-12 PROCEDURE — 3079F DIAST BP 80-89 MM HG: CPT | Mod: CPTII,,, | Performed by: STUDENT IN AN ORGANIZED HEALTH CARE EDUCATION/TRAINING PROGRAM

## 2024-12-12 PROCEDURE — 3074F SYST BP LT 130 MM HG: CPT | Mod: CPTII,,, | Performed by: STUDENT IN AN ORGANIZED HEALTH CARE EDUCATION/TRAINING PROGRAM

## 2024-12-12 PROCEDURE — 99213 OFFICE O/P EST LOW 20 MIN: CPT | Mod: PBBFAC | Performed by: STUDENT IN AN ORGANIZED HEALTH CARE EDUCATION/TRAINING PROGRAM

## 2024-12-12 PROCEDURE — 99213 OFFICE O/P EST LOW 20 MIN: CPT | Mod: S$PBB,,, | Performed by: STUDENT IN AN ORGANIZED HEALTH CARE EDUCATION/TRAINING PROGRAM

## 2024-12-12 PROCEDURE — 99999 PR PBB SHADOW E&M-EST. PATIENT-LVL III: CPT | Mod: PBBFAC,,, | Performed by: STUDENT IN AN ORGANIZED HEALTH CARE EDUCATION/TRAINING PROGRAM

## 2024-12-12 PROCEDURE — 3008F BODY MASS INDEX DOCD: CPT | Mod: CPTII,,, | Performed by: STUDENT IN AN ORGANIZED HEALTH CARE EDUCATION/TRAINING PROGRAM

## 2024-12-12 RX ORDER — FLUTICASONE PROPIONATE 50 MCG
1 SPRAY, SUSPENSION (ML) NASAL DAILY
Qty: 16 G | Refills: 1 | Status: SHIPPED | OUTPATIENT
Start: 2024-12-12

## 2024-12-12 RX ORDER — PROMETHAZINE HYDROCHLORIDE AND PHENYLEPHRINE HYDROCHLORIDE 6.25; 5 MG/5ML; MG/5ML
10 SYRUP ORAL EVERY 6 HOURS PRN
Qty: 300 ML | Refills: 0 | Status: SHIPPED | OUTPATIENT
Start: 2024-12-12 | End: 2024-12-22

## 2024-12-12 NOTE — PROGRESS NOTES
[facility] Note    HPI:  Bianca Richard is a 34 y.o. female who presents today for Nasal Congestion, Cough, and Otalgia (Pt here for cough,sneezing,runny nose earache)    ROS   A review of systems was performed and was negative except as noted above.    I personally reviewed allergies, past medical, surgical, social and family history and updated as appropriate.    Medications:    Current Outpatient Medications:     cholestyramine-aspartame (CHOLESTYRAMINE LIGHT) 4 gram PwPk, Take 1 packet (4 g total) by mouth once daily., Disp: 90 packet, Rfl: 3    cyanocobalamin (VITAMIN B-12) 1000 MCG tablet, Take 0.5 tablets (500 mcg total) by mouth 2 (two) times a day., Disp: 60 tablet, Rfl: 5    EScitalopram oxalate (LEXAPRO) 10 MG tablet, Take 1 tablet (10 mg total) by mouth once daily., Disp: 30 tablet, Rfl: 11    iron polysacch complex-b12-fa 150-25-1 mg-mcg-mg (POLY-IRON 150 FORTE) 150-25-1 mg-mcg-mg Cap, Take 1 capsule by mouth every 48 hours., Disp: 60 capsule, Rfl: 2    sucralfate (CARAFATE) 1 gram tablet, Take 1 tablet (1 g total) by mouth 4 (four) times daily before meals and nightly., Disp: 120 tablet, Rfl: 2    fluticasone propionate (FLONASE) 50 mcg/actuation nasal spray, 1 spray (50 mcg total) by Each Nostril route once daily., Disp: 16 g, Rfl: 1    promethazine-phenylephrine (PROMETHAZINE VC) 6.25-5 mg/5 mL syrup, Take 10 mLs by mouth every 6 (six) hours as needed for Rhinitis or Congestion., Disp: 300 mL, Rfl: 0     Health Maintenance:  Immunization History   Administered Date(s) Administered    DTaP 01/28/1991, 04/22/1991, 09/09/1991, 05/03/1993, 08/04/1995    HIB 04/22/1991, 09/09/1991, 05/03/1993    Hepatitis B, Pediatric/Adolescent 09/22/2004, 11/03/2004, 04/01/2005    IPV 01/28/1991, 04/22/1991, 05/03/1993, 08/04/1995    MMR 05/03/1993, 08/04/1995    Td (ADULT) 09/22/2004    Td - PF (ADULT) 09/22/2004    Tdap 01/11/2018    Varicella 09/22/2004      Health Maintenance   Topic Date Due     "Pneumococcal Vaccines (Age 0-64) (1 of 2 - PCV) Never done    Influenza Vaccine (1) Never done    COVID-19 Vaccine (1 - 2024-25 season) Never done    Cervical Cancer Screening  02/23/2025    TETANUS VACCINE  01/11/2028    RSV Vaccine (Age 60+ and Pregnant patients) (1 - 1-dose 75+ series) 11/22/2065    Hepatitis C Screening  Completed    HIV Screening  Completed    Lipid Panel  Completed     Health Maintenance Topics with due status: Not Due       Topic Last Completion Date    TETANUS VACCINE 01/11/2018    RSV Vaccine (Age 60+ and Pregnant patients) Not Due     Health Maintenance Due   Topic Date Due    Pneumococcal Vaccines (Age 0-64) (1 of 2 - PCV) Never done    Influenza Vaccine (1) Never done    COVID-19 Vaccine (1 - 2024-25 season) Never done    Cervical Cancer Screening  02/23/2025       PHYSICAL EXAM:  Vitals:    12/12/24 1442   BP: 127/84   BP Location: Right arm   Patient Position: Sitting   Pulse: 68   Resp: 18   Temp: 98.8 °F (37.1 °C)   TempSrc: Temporal   SpO2: 100%   Weight: 92.8 kg (204 lb 9.6 oz)   Height: 5' 9" (1.753 m)     Body mass index is 30.21 kg/m².  Physical Exam  Vitals reviewed.   Constitutional:       General: She is not in acute distress.     Appearance: Normal appearance. She is normal weight. She is not ill-appearing, toxic-appearing or diaphoretic.   HENT:      Head: Normocephalic and atraumatic.      Right Ear: External ear normal.      Left Ear: External ear normal.      Nose: Congestion and rhinorrhea present.      Mouth/Throat:      Mouth: Mucous membranes are dry.      Pharynx: Oropharynx is clear.      Comments: +PND  Eyes:      Extraocular Movements: Extraocular movements intact.      Conjunctiva/sclera: Conjunctivae normal.   Cardiovascular:      Rate and Rhythm: Normal rate and regular rhythm.      Pulses: Normal pulses.      Heart sounds: Normal heart sounds. No murmur heard.  Pulmonary:      Effort: Pulmonary effort is normal. No respiratory distress.      Breath sounds: No " stridor. No wheezing, rhonchi or rales.   Abdominal:      General: Abdomen is flat. There is no distension.      Palpations: Abdomen is soft. There is no mass.      Tenderness: There is no abdominal tenderness. There is no right CVA tenderness, left CVA tenderness or guarding.   Musculoskeletal:         General: No swelling, tenderness or deformity. Normal range of motion.      Cervical back: Normal range of motion and neck supple. No rigidity or tenderness.      Right lower leg: No edema.      Left lower leg: No edema.   Lymphadenopathy:      Cervical: No cervical adenopathy.   Skin:     General: Skin is warm and dry.      Capillary Refill: Capillary refill takes less than 2 seconds.   Neurological:      General: No focal deficit present.      Mental Status: She is alert and oriented to person, place, and time. Mental status is at baseline.      Motor: No weakness.      Gait: Gait normal.   Psychiatric:         Mood and Affect: Mood normal.         Behavior: Behavior normal.         Thought Content: Thought content normal.         Judgment: Judgment normal.          ASSESSMENT/PLAN:  1. Sinus congestion  -     promethazine-phenylephrine (PROMETHAZINE VC) 6.25-5 mg/5 mL syrup; Take 10 mLs by mouth every 6 (six) hours as needed for Rhinitis or Congestion.  Dispense: 300 mL; Refill: 0  -     fluticasone propionate (FLONASE) 50 mcg/actuation nasal spray; 1 spray (50 mcg total) by Each Nostril route once daily.  Dispense: 16 g; Refill: 1        Other than changes above, continue current medications and maintain follow up with specialists.      Follow up in about 3 days (around 12/15/2024), or if symptoms worsen or fail to improve.   No results found for this or any previous visit (from the past 12 weeks).      Kazumi G Yoshinaga, DO [facility]